# Patient Record
Sex: FEMALE | Race: BLACK OR AFRICAN AMERICAN | NOT HISPANIC OR LATINO | URBAN - METROPOLITAN AREA
[De-identification: names, ages, dates, MRNs, and addresses within clinical notes are randomized per-mention and may not be internally consistent; named-entity substitution may affect disease eponyms.]

---

## 2018-03-26 ENCOUNTER — INPATIENT (INPATIENT)
Facility: HOSPITAL | Age: 33
LOS: 2 days | Discharge: HOME | DRG: 440 | End: 2018-03-29
Attending: SURGERY | Admitting: SURGERY
Payer: COMMERCIAL

## 2018-03-26 VITALS
WEIGHT: 179.9 LBS | HEIGHT: 63 IN | OXYGEN SATURATION: 99 % | HEART RATE: 88 BPM | TEMPERATURE: 99 F | DIASTOLIC BLOOD PRESSURE: 76 MMHG | RESPIRATION RATE: 18 BRPM | SYSTOLIC BLOOD PRESSURE: 109 MMHG

## 2018-03-26 DIAGNOSIS — K85.10 BILIARY ACUTE PANCREATITIS WITHOUT NECROSIS OR INFECTION: ICD-10-CM

## 2018-03-26 DIAGNOSIS — Z98.891 HISTORY OF UTERINE SCAR FROM PREVIOUS SURGERY: Chronic | ICD-10-CM

## 2018-03-26 PROBLEM — Z00.00 ENCOUNTER FOR PREVENTIVE HEALTH EXAMINATION: Status: ACTIVE | Noted: 2018-03-26

## 2018-03-26 LAB
ALBUMIN SERPL ELPH-MCNC: 3.8 G/DL — SIGNIFICANT CHANGE UP (ref 3.5–5)
ALP SERPL-CCNC: 163 U/L — HIGH (ref 40–120)
ALT FLD-CCNC: 391 U/L DA — HIGH (ref 10–60)
ANION GAP SERPL CALC-SCNC: 7 MMOL/L — SIGNIFICANT CHANGE UP (ref 5–17)
APPEARANCE UR: CLEAR — SIGNIFICANT CHANGE UP
AST SERPL-CCNC: 233 U/L — HIGH (ref 10–40)
BILIRUB SERPL-MCNC: 0.4 MG/DL — SIGNIFICANT CHANGE UP (ref 0.2–1.2)
BILIRUB UR-MCNC: NEGATIVE — SIGNIFICANT CHANGE UP
BUN SERPL-MCNC: 11 MG/DL — SIGNIFICANT CHANGE UP (ref 7–18)
CALCIUM SERPL-MCNC: 9.2 MG/DL — SIGNIFICANT CHANGE UP (ref 8.4–10.5)
CHLORIDE SERPL-SCNC: 106 MMOL/L — SIGNIFICANT CHANGE UP (ref 96–108)
CO2 SERPL-SCNC: 24 MMOL/L — SIGNIFICANT CHANGE UP (ref 22–31)
COLOR SPEC: YELLOW — SIGNIFICANT CHANGE UP
CREAT SERPL-MCNC: 0.79 MG/DL — SIGNIFICANT CHANGE UP (ref 0.5–1.3)
DIFF PNL FLD: ABNORMAL
GLUCOSE SERPL-MCNC: 79 MG/DL — SIGNIFICANT CHANGE UP (ref 70–99)
GLUCOSE UR QL: NEGATIVE — SIGNIFICANT CHANGE UP
HCG UR QL: NEGATIVE — SIGNIFICANT CHANGE UP
HCT VFR BLD CALC: 43.3 % — SIGNIFICANT CHANGE UP (ref 34.5–45)
HGB BLD-MCNC: 14.2 G/DL — SIGNIFICANT CHANGE UP (ref 11.5–15.5)
KETONES UR-MCNC: NEGATIVE — SIGNIFICANT CHANGE UP
LEUKOCYTE ESTERASE UR-ACNC: NEGATIVE — SIGNIFICANT CHANGE UP
LIDOCAIN IGE QN: 3463 U/L — HIGH (ref 73–393)
MCHC RBC-ENTMCNC: 28.2 PG — SIGNIFICANT CHANGE UP (ref 27–34)
MCHC RBC-ENTMCNC: 32.8 GM/DL — SIGNIFICANT CHANGE UP (ref 32–36)
MCV RBC AUTO: 86 FL — SIGNIFICANT CHANGE UP (ref 80–100)
NITRITE UR-MCNC: NEGATIVE — SIGNIFICANT CHANGE UP
PH UR: 5 — SIGNIFICANT CHANGE UP (ref 5–8)
PLATELET # BLD AUTO: 463 K/UL — HIGH (ref 150–400)
POTASSIUM SERPL-MCNC: 4 MMOL/L — SIGNIFICANT CHANGE UP (ref 3.5–5.3)
POTASSIUM SERPL-SCNC: 4 MMOL/L — SIGNIFICANT CHANGE UP (ref 3.5–5.3)
PROT SERPL-MCNC: 7.9 G/DL — SIGNIFICANT CHANGE UP (ref 6–8.3)
PROT UR-MCNC: NEGATIVE — SIGNIFICANT CHANGE UP
RBC # BLD: 5.04 M/UL — SIGNIFICANT CHANGE UP (ref 3.8–5.2)
RBC # FLD: 13.1 % — SIGNIFICANT CHANGE UP (ref 10.3–14.5)
SODIUM SERPL-SCNC: 137 MMOL/L — SIGNIFICANT CHANGE UP (ref 135–145)
SP GR SPEC: 1.02 — SIGNIFICANT CHANGE UP (ref 1.01–1.02)
UROBILINOGEN FLD QL: NEGATIVE — SIGNIFICANT CHANGE UP
WBC # BLD: 5.3 K/UL — SIGNIFICANT CHANGE UP (ref 3.8–10.5)
WBC # FLD AUTO: 5.3 K/UL — SIGNIFICANT CHANGE UP (ref 3.8–10.5)

## 2018-03-26 PROCEDURE — 99285 EMERGENCY DEPT VISIT HI MDM: CPT

## 2018-03-26 PROCEDURE — 76705 ECHO EXAM OF ABDOMEN: CPT | Mod: 26

## 2018-03-26 PROCEDURE — 99222 1ST HOSP IP/OBS MODERATE 55: CPT

## 2018-03-26 RX ORDER — ONDANSETRON 8 MG/1
4 TABLET, FILM COATED ORAL EVERY 6 HOURS
Qty: 0 | Refills: 0 | Status: DISCONTINUED | OUTPATIENT
Start: 2018-03-26 | End: 2018-03-29

## 2018-03-26 RX ORDER — FAMOTIDINE 10 MG/ML
20 INJECTION INTRAVENOUS ONCE
Qty: 0 | Refills: 0 | Status: COMPLETED | OUTPATIENT
Start: 2018-03-26 | End: 2018-03-26

## 2018-03-26 RX ORDER — HEPARIN SODIUM 5000 [USP'U]/ML
5000 INJECTION INTRAVENOUS; SUBCUTANEOUS EVERY 8 HOURS
Qty: 0 | Refills: 0 | Status: DISCONTINUED | OUTPATIENT
Start: 2018-03-26 | End: 2018-03-29

## 2018-03-26 RX ORDER — SODIUM CHLORIDE 9 MG/ML
1000 INJECTION INTRAMUSCULAR; INTRAVENOUS; SUBCUTANEOUS
Qty: 0 | Refills: 0 | Status: DISCONTINUED | OUTPATIENT
Start: 2018-03-26 | End: 2018-03-27

## 2018-03-26 RX ADMIN — SODIUM CHLORIDE 120 MILLILITER(S): 9 INJECTION INTRAMUSCULAR; INTRAVENOUS; SUBCUTANEOUS at 23:45

## 2018-03-26 RX ADMIN — FAMOTIDINE 20 MILLIGRAM(S): 10 INJECTION INTRAVENOUS at 13:31

## 2018-03-26 RX ADMIN — Medication 30 MILLILITER(S): at 13:31

## 2018-03-26 NOTE — ED PROVIDER NOTE - PROGRESS NOTE DETAILS
choledocholithiasis / gallstone pancreatitis. d/w med - requesting surg consult. d/w surg pa - will see pt.

## 2018-03-26 NOTE — H&P ADULT - HISTORY OF PRESENT ILLNESS
33 year old female pmhx gastric reflux presents with epigastric pain worsening for 2 weeks. She denies nausea or vomiting and no fevers at home. She says pain is sometimes precipitated after she eats but not always. She denies any similar episodes in the past.

## 2018-03-26 NOTE — ED PROVIDER NOTE - MEDICAL DECISION MAKING DETAILS
34 y/o F pt presents with epigastric pain. Do not suspect cardiac cause given the positional nature of the pain as well as reproducibility. Likely gastritis. Will refer to GI, will do US of abdomen to r/o acute cholecystitis (however I believe this is unlikely), and will reassess.

## 2018-03-26 NOTE — H&P ADULT - PROBLEM SELECTOR PLAN 1
1) admit to surgery  2) npo  3) iv fluids  4) f.u CT scan to evaluate pancreas  5) trend lft's  6) plan for lap deonna  7) case and plan discussed with Dr. Danielle and agrees

## 2018-03-26 NOTE — H&P ADULT - NSHPLABSRESULTS_GEN_ALL_CORE
14.2   5.3   )-----------( 463      ( 26 Mar 2018 13:46 )             43.3   03-26    137  |  106  |  11  ----------------------------<  79  4.0   |  24  |  0.79    Ca    9.2      26 Mar 2018 13:46    TPro  7.9  /  Alb  3.8  /  TBili  0.4  /  DBili  x   /  AST  233<H>  /  ALT  391<H>  /  AlkPhos  163<H>  03-26  < from: US Hepatic & Pancreatic (03.26.18 @ 15:20) >    IMPRESSION:  1. Multiple gallstones identified within a contracted gallbladder.   Gallbladder wall thickness approximates 3-4 mm.  2. Extrahepatic CBD measures 7 mm, prominent in size. A 5-6 mm   hyperechoic focus within the proximal to mid CBD may represent a CBD   calculus.  3. 2.8 x 2.5 x 2.5 cm homogeneously hyperechoic lesion right hepatic   lobe, which may represent a hepatic parenchymal hemangioma.    Clinical correlation is suggested. Further evaluation with either   contrast-enhanced CT of the abdomen/pelvis or contrast enhanced MRI   abdomen with MRCP can be performed.    < end of copied text >

## 2018-03-26 NOTE — H&P ADULT - NSHPPHYSICALEXAM_GEN_ALL_CORE
General: A&Ox3, NAD  Skin: no jaundice, no jaundice  Abdomen: soft, epigastric tenderness to palpation, nondistended, no masses, well healed  scar  Extremities: no edema, no calf pain bilaterally

## 2018-03-26 NOTE — ED PROVIDER NOTE - OBJECTIVE STATEMENT
34 y/o F pt with PMHx of Acid Reflux and no significant PSHx presents to ED c/o upper abdominal pain since yesterday. Pt describes upper abdominal pain as worse when lying down and constant. Per pt, pt was seen in the ER x3 weeks ago for similar sx's; pt was prescribed Pepcid 20mg TID, and took Pepcid for several weeks with no relief of sx's. Pt reports switching medications to Zantac 150mg with mild relief of sx's. Pt states that no US of the abdomen was performed in the ED x3 weeks ago, however an EKG was performed at the time, which was found to be "abnormal". Pt denies nausea, vomiting, diarrhea, constipation, cough, sneezing, runny nose, or any other complaints. Pt also denies possibility of pregnancy, recent illness, Hx of gallbladder problems, or having visited a GI physician for current sx's. Pt notes having similar sx's in the past, but not as severe as current sx's. NKDA.

## 2018-03-27 LAB
ALBUMIN SERPL ELPH-MCNC: 3.5 G/DL — SIGNIFICANT CHANGE UP (ref 3.5–5)
ALP SERPL-CCNC: 137 U/L — HIGH (ref 40–120)
ALT FLD-CCNC: 289 U/L DA — HIGH (ref 10–60)
ANION GAP SERPL CALC-SCNC: 9 MMOL/L — SIGNIFICANT CHANGE UP (ref 5–17)
AST SERPL-CCNC: 105 U/L — HIGH (ref 10–40)
BILIRUB SERPL-MCNC: 0.7 MG/DL — SIGNIFICANT CHANGE UP (ref 0.2–1.2)
BUN SERPL-MCNC: 10 MG/DL — SIGNIFICANT CHANGE UP (ref 7–18)
CALCIUM SERPL-MCNC: 9.1 MG/DL — SIGNIFICANT CHANGE UP (ref 8.4–10.5)
CHLORIDE SERPL-SCNC: 107 MMOL/L — SIGNIFICANT CHANGE UP (ref 96–108)
CO2 SERPL-SCNC: 22 MMOL/L — SIGNIFICANT CHANGE UP (ref 22–31)
CREAT SERPL-MCNC: 0.79 MG/DL — SIGNIFICANT CHANGE UP (ref 0.5–1.3)
GLUCOSE SERPL-MCNC: 75 MG/DL — SIGNIFICANT CHANGE UP (ref 70–99)
HCT VFR BLD CALC: 43.4 % — SIGNIFICANT CHANGE UP (ref 34.5–45)
HGB BLD-MCNC: 13.9 G/DL — SIGNIFICANT CHANGE UP (ref 11.5–15.5)
LIDOCAIN IGE QN: 493 U/L — HIGH (ref 73–393)
MCHC RBC-ENTMCNC: 27.2 PG — SIGNIFICANT CHANGE UP (ref 27–34)
MCHC RBC-ENTMCNC: 32 GM/DL — SIGNIFICANT CHANGE UP (ref 32–36)
MCV RBC AUTO: 84.9 FL — SIGNIFICANT CHANGE UP (ref 80–100)
PLATELET # BLD AUTO: 432 K/UL — HIGH (ref 150–400)
POTASSIUM SERPL-MCNC: 4 MMOL/L — SIGNIFICANT CHANGE UP (ref 3.5–5.3)
POTASSIUM SERPL-SCNC: 4 MMOL/L — SIGNIFICANT CHANGE UP (ref 3.5–5.3)
PROT SERPL-MCNC: 7.6 G/DL — SIGNIFICANT CHANGE UP (ref 6–8.3)
RBC # BLD: 5.12 M/UL — SIGNIFICANT CHANGE UP (ref 3.8–5.2)
RBC # FLD: 12.9 % — SIGNIFICANT CHANGE UP (ref 10.3–14.5)
SODIUM SERPL-SCNC: 138 MMOL/L — SIGNIFICANT CHANGE UP (ref 135–145)
WBC # BLD: 5.6 K/UL — SIGNIFICANT CHANGE UP (ref 3.8–10.5)
WBC # FLD AUTO: 5.6 K/UL — SIGNIFICANT CHANGE UP (ref 3.8–10.5)

## 2018-03-27 PROCEDURE — 74177 CT ABD & PELVIS W/CONTRAST: CPT | Mod: 26

## 2018-03-27 PROCEDURE — 99231 SBSQ HOSP IP/OBS SF/LOW 25: CPT

## 2018-03-27 PROCEDURE — 99223 1ST HOSP IP/OBS HIGH 75: CPT

## 2018-03-27 RX ORDER — SODIUM CHLORIDE 9 MG/ML
1000 INJECTION, SOLUTION INTRAVENOUS
Qty: 0 | Refills: 0 | Status: DISCONTINUED | OUTPATIENT
Start: 2018-03-27 | End: 2018-03-29

## 2018-03-27 NOTE — PROGRESS NOTE ADULT - SUBJECTIVE AND OBJECTIVE BOX
SUBJECTIVE    Nausea [ ] YES [ X] NO  Vomiting [ ] YES [X ] NO  Voiding normally [X ] YES [ ] NO  Flatus [X ] YES [ ] NO  BM [ ] YES [X ] NO  Pain under control [X ] YES [ ] NO  NPO  Ambulated [X ] YES NO [ ]  Using Incentive Spirometer [ X] YES [ ] NO      VITALS    ICU Vital Signs Last 24 Hrs  T(C): 36.9 (27 Mar 2018 05:59), Max: 37 (26 Mar 2018 09:50)  T(F): 98.4 (27 Mar 2018 05:59), Max: 98.6 (26 Mar 2018 09:50)  HR: 65 (27 Mar 2018 05:59) (60 - 88)  BP: 105/65 (27 Mar 2018 05:59) (96/55 - 126/81)  BP(mean): --  ABP: --  ABP(mean): --  RR: 18 (27 Mar 2018 05:59) (18 - 18)  SpO2: 98% (27 Mar 2018 05:59) (98% - 100%)    I&O's Detail        PHYSICAL EXAMINATION    Abdomen: soft, ND, NT    I&O's Summary      LABS                        14.2   5.3   )-----------( 463      ( 26 Mar 2018 13:46 )             43.3             03-27    138  |  107  |  10  ----------------------------<  75  4.0   |  22  |  0.79    Ca    9.1      27 Mar 2018 07:21    TPro  7.6  /  Alb  3.5  /  TBili  0.7  /  DBili  x   /  AST  105<H>  /  ALT  289<H>  /  AlkPhos  137<H>  03-27    LIVER FUNCTIONS - ( 27 Mar 2018 07:21 )  Alb: 3.5 g/dL / Pro: 7.6 g/dL / ALK PHOS: 137 U/L / ALT: 289 U/L DA / AST: 105 U/L / GGT: x             MEDICATIONS:  MEDICATIONS  (STANDING):  heparin  Injectable 5000 Unit(s) SubCutaneous every 8 hours  sodium chloride 0.9%. 1000 milliLiter(s) (120 mL/Hr) IV Continuous <Continuous>    MEDICATIONS  (PRN):  ondansetron Injectable 4 milliGRAM(s) IV Push every 6 hours PRN Nausea

## 2018-03-27 NOTE — CONSULT NOTE ADULT - ASSESSMENT
33y Female  presents with a chief complaint of epigastric pain. US and CT showed pancreatitis, sludge/stone in the distal CBD.    Hemodynamically stable  no leukocytosis  Lipase, LFTs trending down  ERCP, with stent for Thursday  advance diet as tolerated, NPO at Wednesday midnight

## 2018-03-27 NOTE — CONSULT NOTE ADULT - SUBJECTIVE AND OBJECTIVE BOX
Patient is a 33y old  Female who presents with a chief complaint of epigastric abdominal pain (26 Mar 2018 16:50)    HPI: 33y Female  presents with a chief complaint of epigastric pain. US and CT showed pancreatitis, sludge/stone in the distal CBD. Lipase trending down. Bilirubin normal. The patient has a significant past medical history of GERD.    Currently, no complaints of abdominal pain. Patient expressed desire to eat. Willing to stay for ERCP if given diet.     REVIEW OF SYSTEMS  Constitutional:   No fever, no fatigue, no pallor, no night sweats, no weight loss.  HEENT:   No eye pain, no vision changes, no icterus, no mouth ulcers.  Respiratory:   No shortness of breath, no cough, no respiratory distress.   Cardiovascular:   No chest pain, no palpitations.   Gastrointestinal: No abdominal pain, no nausea, no vomiting , no diahrrea, no constipation, no hematochezia, no melena.  Skin:   No rashes, no jaundice, no eczema.   Musculoskeletal:   No joint pain, no swelling, no myalgia.   Neurologic:   No headache, no seizure, no weakness.   Genitourinary:   No dysuria, no decreased urine output.  Psychiatric:  No depression, no anxiety,   Endocrine:   No thyroid disease, no diabetes.  Heme/Lymphatic:   No anemia, no blood transfusions, no lymph node enlargement, no bleeding, no bruising.  ___________________________________________________________________________________________  Allergies    No Known Drug Allergies  shrimp (Hives; Swelling)    Intolerances      MEDICATIONS  (STANDING):  dextrose 5% + sodium chloride 0.45% 1000 milliLiter(s) (125 mL/Hr) IV Continuous <Continuous>  heparin  Injectable 5000 Unit(s) SubCutaneous every 8 hours    MEDICATIONS  (PRN):  ondansetron Injectable 4 milliGRAM(s) IV Push every 6 hours PRN Nausea      PAST MEDICAL & SURGICAL HISTORY:  Acid reflux  H/O:     FAMILY HISTORY:  No pertinent family history in first degree relatives    Social History: No hsitory of : Tobacco use, IVDA, EToH  ______________________________________________________________________________________    PHYSICAL EXAM    Daily Height in cm: 160.02 (27 Mar 2018 02:32)    Daily   BMI: 31.9 ( @ 02:32)  Change in Weight:  Vital Signs Last 24 Hrs  T(C): 37 (27 Mar 2018 13:39), Max: 37 (27 Mar 2018 13:39)  T(F): 98.6 (27 Mar 2018 13:39), Max: 98.6 (27 Mar 2018 13:39)  HR: 86 (27 Mar 2018 13:39) (60 - 86)  BP: 117/72 (27 Mar 2018 13:39) (96/55 - 126/81)  BP(mean): --  RR: 17 (27 Mar 2018 13:39) (17 - 18)  SpO2: 100% (27 Mar 2018 13:39) (98% - 100%)    General:  Well developed, well nourished, alert and active, no pallor, NAD.  HEENT:    Normal appearance of conjunctiva, ears, nose, lips, oropharynx, and oral mucosa, anicteric.  Neck:  No masses, no asymmetry.  Lymph Nodes:  No lymphadenopathy.   Cardiovascular:  RRR normal S1/S2, no murmur.  Respiratory:  CTA B/L, normal respiratory effort.   Abdominal:   soft, no masses or tenderness, normoactive BS, NT/ND, no HSM.  Extremities:   No clubbing or cyanosis, normal capillary refill, no edema.   Skin:   No rash, jaundice, lesions, eczema.   Musculoskeletal:  No joint swelling, erythema or tenderness.   Neuro: No focal deficits.   Other:   _______________________________________________________________________________________________  Lab Results:                          13.9   5.6   )-----------( 432      ( 27 Mar 2018 07:21 )             43.4         138  |  107  |  10  ----------------------------<  75  4.0   |  22  |  0.79    Ca    9.1      27 Mar 2018 07:21    TPro  7.6  /  Alb  3.5  /  TBili  0.7  /  DBili  x   /  AST  105<H>  /  ALT  289<H>  /  AlkPhos  137<H>      LIVER FUNCTIONS - ( 27 Mar 2018 07:21 )  Alb: 3.5 g/dL / Pro: 7.6 g/dL / ALK PHOS: 137 U/L / ALT: 289 U/L DA / AST: 105 U/L / GGT: x                   Stool Results:          RADIOLOGY RESULTS:    SURGICAL PATHOLOGY:

## 2018-03-27 NOTE — PROGRESS NOTE ADULT - ASSESSMENT
33F with gallstone pancreatitis. Pain resolved.     1) f/u labs  2) if necessary, MRCP and ERCP if indicated  3) patient adamant to schedule surgery after this admission. I explained risk of recurrence and complications thereof.

## 2018-03-28 PROCEDURE — 99231 SBSQ HOSP IP/OBS SF/LOW 25: CPT

## 2018-03-28 NOTE — PROGRESS NOTE ADULT - ATTENDING COMMENTS
as above, I agree with the stated assessment and plan. ERCP tomorrow given findings on imaging at admission.

## 2018-03-28 NOTE — PROGRESS NOTE ADULT - ASSESSMENT
32 y/o Female w/ gallstone pancreatitis     -f/u AM labs   -Trend LFTs   -Low fat diet   -ERCP 3/29 w/ GI   -DVT ppx

## 2018-03-28 NOTE — PROGRESS NOTE ADULT - SUBJECTIVE AND OBJECTIVE BOX
INTERVAL HPI/OVERNIGHT EVENTS:    Pt seen and examined at bedside. No acute complaints at this time. Abd pain improved, no nausea, vomiting. Denies fever, chills, SOB or CP. On low fat diet, tolerating well.      Vital Signs Last 24 Hrs  T(C): 36.9 (28 Mar 2018 06:23), Max: 37.2 (27 Mar 2018 22:20)  T(F): 98.5 (28 Mar 2018 06:23), Max: 98.9 (27 Mar 2018 22:20)  HR: 67 (28 Mar 2018 06:23) (67 - 86)  BP: 104/59 (28 Mar 2018 06:23) (104/59 - 117/72)  BP(mean): --  RR: 18 (28 Mar 2018 06:23) (17 - 18)  SpO2: 97% (28 Mar 2018 06:23) (97% - 100%)  I&O's Detail        Physical Exam  General: AAOx3, No acute distress  Skin: No jaundice, no icterus  Abdomen: soft, nondistended, nontender, no rebound tenderness, no guarding, no palpable masses  : Normal external genitalia  Extremities: non edematous, no calf pain bilaterally

## 2018-03-28 NOTE — PROGRESS NOTE ADULT - NSHPATTENDINGPLANDISCUSS_GEN_ALL_CORE
surgical team and patient; patient agrees with plan of care
surgical team and patient; patient agrees with plan

## 2018-03-29 ENCOUNTER — TRANSCRIPTION ENCOUNTER (OUTPATIENT)
Age: 33
End: 2018-03-29

## 2018-03-29 VITALS
DIASTOLIC BLOOD PRESSURE: 61 MMHG | SYSTOLIC BLOOD PRESSURE: 114 MMHG | HEART RATE: 73 BPM | RESPIRATION RATE: 16 BRPM | TEMPERATURE: 99 F | OXYGEN SATURATION: 98 %

## 2018-03-29 PROBLEM — K21.9 GASTRO-ESOPHAGEAL REFLUX DISEASE WITHOUT ESOPHAGITIS: Chronic | Status: ACTIVE | Noted: 2018-03-26

## 2018-03-29 LAB
ALBUMIN SERPL ELPH-MCNC: 3.7 G/DL — SIGNIFICANT CHANGE UP (ref 3.5–5)
ALP SERPL-CCNC: 122 U/L — HIGH (ref 40–120)
ALT FLD-CCNC: 154 U/L DA — HIGH (ref 10–60)
ANION GAP SERPL CALC-SCNC: 7 MMOL/L — SIGNIFICANT CHANGE UP (ref 5–17)
AST SERPL-CCNC: 24 U/L — SIGNIFICANT CHANGE UP (ref 10–40)
BILIRUB SERPL-MCNC: 0.6 MG/DL — SIGNIFICANT CHANGE UP (ref 0.2–1.2)
BUN SERPL-MCNC: 11 MG/DL — SIGNIFICANT CHANGE UP (ref 7–18)
CALCIUM SERPL-MCNC: 9.3 MG/DL — SIGNIFICANT CHANGE UP (ref 8.4–10.5)
CHLORIDE SERPL-SCNC: 107 MMOL/L — SIGNIFICANT CHANGE UP (ref 96–108)
CO2 SERPL-SCNC: 25 MMOL/L — SIGNIFICANT CHANGE UP (ref 22–31)
CREAT SERPL-MCNC: 0.81 MG/DL — SIGNIFICANT CHANGE UP (ref 0.5–1.3)
GLUCOSE SERPL-MCNC: 80 MG/DL — SIGNIFICANT CHANGE UP (ref 70–99)
HCT VFR BLD CALC: 44 % — SIGNIFICANT CHANGE UP (ref 34.5–45)
HGB BLD-MCNC: 14.1 G/DL — SIGNIFICANT CHANGE UP (ref 11.5–15.5)
MCHC RBC-ENTMCNC: 27.2 PG — SIGNIFICANT CHANGE UP (ref 27–34)
MCHC RBC-ENTMCNC: 32 GM/DL — SIGNIFICANT CHANGE UP (ref 32–36)
MCV RBC AUTO: 85.1 FL — SIGNIFICANT CHANGE UP (ref 80–100)
PLATELET # BLD AUTO: 431 K/UL — HIGH (ref 150–400)
POTASSIUM SERPL-MCNC: 4.1 MMOL/L — SIGNIFICANT CHANGE UP (ref 3.5–5.3)
POTASSIUM SERPL-SCNC: 4.1 MMOL/L — SIGNIFICANT CHANGE UP (ref 3.5–5.3)
PROT SERPL-MCNC: 8.3 G/DL — SIGNIFICANT CHANGE UP (ref 6–8.3)
RBC # BLD: 5.18 M/UL — SIGNIFICANT CHANGE UP (ref 3.8–5.2)
RBC # FLD: 12.8 % — SIGNIFICANT CHANGE UP (ref 10.3–14.5)
SODIUM SERPL-SCNC: 139 MMOL/L — SIGNIFICANT CHANGE UP (ref 135–145)
WBC # BLD: 4.5 K/UL — SIGNIFICANT CHANGE UP (ref 3.8–10.5)
WBC # FLD AUTO: 4.5 K/UL — SIGNIFICANT CHANGE UP (ref 3.8–10.5)

## 2018-03-29 PROCEDURE — 74177 CT ABD & PELVIS W/CONTRAST: CPT

## 2018-03-29 PROCEDURE — 80053 COMPREHEN METABOLIC PANEL: CPT

## 2018-03-29 PROCEDURE — 99231 SBSQ HOSP IP/OBS SF/LOW 25: CPT

## 2018-03-29 PROCEDURE — 83690 ASSAY OF LIPASE: CPT

## 2018-03-29 PROCEDURE — 85027 COMPLETE CBC AUTOMATED: CPT

## 2018-03-29 PROCEDURE — 93005 ELECTROCARDIOGRAM TRACING: CPT

## 2018-03-29 PROCEDURE — 81025 URINE PREGNANCY TEST: CPT

## 2018-03-29 PROCEDURE — 81001 URINALYSIS AUTO W/SCOPE: CPT

## 2018-03-29 PROCEDURE — 76705 ECHO EXAM OF ABDOMEN: CPT

## 2018-03-29 PROCEDURE — 96374 THER/PROPH/DIAG INJ IV PUSH: CPT

## 2018-03-29 PROCEDURE — 99285 EMERGENCY DEPT VISIT HI MDM: CPT | Mod: 25

## 2018-03-29 RX ORDER — INDOMETHACIN 50 MG
100 CAPSULE ORAL ONCE
Qty: 0 | Refills: 0 | Status: DISCONTINUED | OUTPATIENT
Start: 2018-03-29 | End: 2018-03-29

## 2018-03-29 NOTE — DISCHARGE NOTE ADULT - CARE PLAN
Principal Discharge DX:	Gallstone pancreatitis  Goal:	Tolerate diet, follow up with GI  Assessment and plan of treatment:	Please follow up with GI- Dr. Cleary within 2 weeks for elective ERCP  Maintain Low fat diet   Follow up with Dr. Danielle if you want to proceed with surgery

## 2018-03-29 NOTE — DISCHARGE NOTE ADULT - PATIENT PORTAL LINK FT
You can access the [facility] Patient Portal, offered by [facility], by registering with the following website: [email or web address]

## 2018-03-29 NOTE — PROGRESS NOTE ADULT - SUBJECTIVE AND OBJECTIVE BOX
Surgery    Subjective:  Pt resting comfortably. Extremely anxious about upcoming procedures.  Has many questions.  Tolerating diet  Denies N/V    T(C): 37 (03-29-18 @ 06:04), Max: 37 (03-28-18 @ 14:39)  HR: 73 (03-29-18 @ 06:04) (59 - 82)  BP: 114/61 (03-29-18 @ 06:04) (113/84 - 120/75)  RR: 16 (03-29-18 @ 06:04) (16 - 18)  SpO2: 98% (03-29-18 @ 06:04) (96% - 98%)    Physical:  Gen: A&O x3  Abd: Soft ND, NT

## 2018-03-29 NOTE — DISCHARGE NOTE ADULT - PLAN OF CARE
Tolerate diet, follow up with GI Please follow up with GI- Dr. Cleary within 2 weeks for elective ERCP  Maintain Low fat diet   Follow up with Dr. Danielle if you want to proceed with surgery

## 2018-03-29 NOTE — DISCHARGE NOTE ADULT - HOSPITAL COURSE
33 year old female pmhx gastric reflux presents with epigastric pain worsening for 2 weeks. She denies nausea or vomiting and no fevers at home. She says pain is sometimes precipitated after she eats but not always. She denies any similar episodes in the past.     Patient was admitted with gallstone pancreatitis. Patient was kept NPO and started in IV fluids and antibiotics. Patient was seen by GI- offered to have ERCP. Patient does not was to proceed with ercp or surgery at this time. Patient has no signs of CBD obstruction and pancreatitis resolved. DIet was advanced and tolerated. Patient for d/c home with outpatient follow up

## 2018-03-29 NOTE — CHART NOTE - NSCHARTNOTEFT_GEN_A_CORE
When explaining the risks of the procedure ERCP the patient became concerned and hesitant to sign consent.     She will follow up outpatient ofr elective ERCP and cholecystectomy.  I explained the risks of not having the ERCP today and she understands.  I explained that if she experiences fever , chills, nausea, vomiting to return to the hospital.

## 2018-03-29 NOTE — DISCHARGE NOTE ADULT - CARE PROVIDERS DIRECT ADDRESSES
[email or web address]

## 2018-03-29 NOTE — DISCHARGE NOTE ADULT - CARE PROVIDER_API CALL
Louis Palomino), Gastroenterology; Internal Medicine  [address] [place] [address]  Phone: [phone]  Fax: [phone]    Cristian Danielle), Surgery  [address] [place] [address]  Phone: [phone]  Fax: [phone]

## 2018-03-29 NOTE — DISCHARGE NOTE ADULT - CONDITIONS AT DISCHARGE
Pt AOx3 breathing unlabored no c/o resp. distress chest pain or SOB. Pt with Dx. of Acute Billary pancreatitis denies any pain at current time for possible ERCP today. Abdomen soft non tender non distended BS present in all 4 quadrants Pt remains NPO for procedure today. Cap refill less than 3 seconds pulses present in all extremities Pt OOB ambulating voiding without any difficulties. Vital signs stable no distress noted. Pt refusing ERCP for today advised to follow up with Dr. Cleary as outpatient procedure, pt verbalizes understanding and agreement with instructions Pt for DC home verbalizes understanding and agreement with DC. All needs of pt met thus far .

## 2018-03-31 ENCOUNTER — INPATIENT (INPATIENT)
Facility: HOSPITAL | Age: 33
LOS: 6 days | Discharge: HOME | End: 2018-04-07
Attending: SPECIALIST | Admitting: SPECIALIST
Payer: COMMERCIAL

## 2018-03-31 VITALS
RESPIRATION RATE: 18 BRPM | OXYGEN SATURATION: 100 % | DIASTOLIC BLOOD PRESSURE: 76 MMHG | SYSTOLIC BLOOD PRESSURE: 127 MMHG | HEART RATE: 76 BPM | TEMPERATURE: 99 F

## 2018-03-31 DIAGNOSIS — K85.10 BILIARY ACUTE PANCREATITIS WITHOUT NECROSIS OR INFECTION: ICD-10-CM

## 2018-03-31 DIAGNOSIS — Z98.891 HISTORY OF UTERINE SCAR FROM PREVIOUS SURGERY: Chronic | ICD-10-CM

## 2018-03-31 LAB
ALBUMIN SERPL ELPH-MCNC: 4.3 G/DL — SIGNIFICANT CHANGE UP (ref 3.3–5)
ALP SERPL-CCNC: 123 U/L — HIGH (ref 40–120)
ALT FLD-CCNC: 214 U/L — HIGH (ref 4–33)
AST SERPL-CCNC: 228 U/L — HIGH (ref 4–32)
BASOPHILS # BLD AUTO: 0.02 K/UL — SIGNIFICANT CHANGE UP (ref 0–0.2)
BASOPHILS NFR BLD AUTO: 0.2 % — SIGNIFICANT CHANGE UP (ref 0–2)
BILIRUB SERPL-MCNC: 1.2 MG/DL — SIGNIFICANT CHANGE UP (ref 0.2–1.2)
BUN SERPL-MCNC: 10 MG/DL — SIGNIFICANT CHANGE UP (ref 7–23)
BUN SERPL-MCNC: 9 MG/DL — SIGNIFICANT CHANGE UP (ref 7–23)
CALCIUM SERPL-MCNC: 8 MG/DL — LOW (ref 8.4–10.5)
CALCIUM SERPL-MCNC: 9 MG/DL — SIGNIFICANT CHANGE UP (ref 8.4–10.5)
CHLORIDE SERPL-SCNC: 100 MMOL/L — SIGNIFICANT CHANGE UP (ref 98–107)
CHLORIDE SERPL-SCNC: 103 MMOL/L — SIGNIFICANT CHANGE UP (ref 98–107)
CO2 SERPL-SCNC: 19 MMOL/L — LOW (ref 22–31)
CO2 SERPL-SCNC: 21 MMOL/L — LOW (ref 22–31)
CREAT SERPL-MCNC: 0.7 MG/DL — SIGNIFICANT CHANGE UP (ref 0.5–1.3)
CREAT SERPL-MCNC: 0.76 MG/DL — SIGNIFICANT CHANGE UP (ref 0.5–1.3)
EOSINOPHIL # BLD AUTO: 0.01 K/UL — SIGNIFICANT CHANGE UP (ref 0–0.5)
EOSINOPHIL NFR BLD AUTO: 0.1 % — SIGNIFICANT CHANGE UP (ref 0–6)
GLUCOSE SERPL-MCNC: 101 MG/DL — HIGH (ref 70–99)
GLUCOSE SERPL-MCNC: 109 MG/DL — HIGH (ref 70–99)
HCT VFR BLD CALC: 40.8 % — SIGNIFICANT CHANGE UP (ref 34.5–45)
HGB BLD-MCNC: 13.9 G/DL — SIGNIFICANT CHANGE UP (ref 11.5–15.5)
IMM GRANULOCYTES # BLD AUTO: 0.04 # — SIGNIFICANT CHANGE UP
IMM GRANULOCYTES NFR BLD AUTO: 0.4 % — SIGNIFICANT CHANGE UP (ref 0–1.5)
LIDOCAIN IGE QN: > 600 U/L — HIGH (ref 7–60)
LYMPHOCYTES # BLD AUTO: 0.49 K/UL — LOW (ref 1–3.3)
LYMPHOCYTES # BLD AUTO: 4.4 % — LOW (ref 13–44)
MCHC RBC-ENTMCNC: 27.4 PG — SIGNIFICANT CHANGE UP (ref 27–34)
MCHC RBC-ENTMCNC: 34.1 % — SIGNIFICANT CHANGE UP (ref 32–36)
MCV RBC AUTO: 80.5 FL — SIGNIFICANT CHANGE UP (ref 80–100)
MONOCYTES # BLD AUTO: 0.46 K/UL — SIGNIFICANT CHANGE UP (ref 0–0.9)
MONOCYTES NFR BLD AUTO: 4.2 % — SIGNIFICANT CHANGE UP (ref 2–14)
NEUTROPHILS # BLD AUTO: 10 K/UL — HIGH (ref 1.8–7.4)
NEUTROPHILS NFR BLD AUTO: 90.7 % — HIGH (ref 43–77)
NRBC # FLD: 0 — SIGNIFICANT CHANGE UP
PLATELET # BLD AUTO: 427 K/UL — HIGH (ref 150–400)
PMV BLD: 8.7 FL — SIGNIFICANT CHANGE UP (ref 7–13)
POTASSIUM SERPL-MCNC: 4.3 MMOL/L — SIGNIFICANT CHANGE UP (ref 3.5–5.3)
POTASSIUM SERPL-MCNC: 6.3 MMOL/L — CRITICAL HIGH (ref 3.5–5.3)
POTASSIUM SERPL-SCNC: 4.3 MMOL/L — SIGNIFICANT CHANGE UP (ref 3.5–5.3)
POTASSIUM SERPL-SCNC: 6.3 MMOL/L — CRITICAL HIGH (ref 3.5–5.3)
PROT SERPL-MCNC: 8.5 G/DL — HIGH (ref 6–8.3)
RBC # BLD: 5.07 M/UL — SIGNIFICANT CHANGE UP (ref 3.8–5.2)
RBC # FLD: 13.3 % — SIGNIFICANT CHANGE UP (ref 10.3–14.5)
SODIUM SERPL-SCNC: 135 MMOL/L — SIGNIFICANT CHANGE UP (ref 135–145)
SODIUM SERPL-SCNC: 136 MMOL/L — SIGNIFICANT CHANGE UP (ref 135–145)
WBC # BLD: 11.02 K/UL — HIGH (ref 3.8–10.5)
WBC # FLD AUTO: 11.02 K/UL — HIGH (ref 3.8–10.5)

## 2018-03-31 PROCEDURE — 76705 ECHO EXAM OF ABDOMEN: CPT | Mod: 26

## 2018-03-31 PROCEDURE — 99222 1ST HOSP IP/OBS MODERATE 55: CPT

## 2018-03-31 RX ORDER — MORPHINE SULFATE 50 MG/1
2 CAPSULE, EXTENDED RELEASE ORAL EVERY 4 HOURS
Qty: 0 | Refills: 0 | Status: DISCONTINUED | OUTPATIENT
Start: 2018-03-31 | End: 2018-04-03

## 2018-03-31 RX ORDER — SODIUM CHLORIDE 9 MG/ML
1000 INJECTION, SOLUTION INTRAVENOUS
Qty: 0 | Refills: 0 | Status: DISCONTINUED | OUTPATIENT
Start: 2018-03-31 | End: 2018-04-06

## 2018-03-31 RX ORDER — SODIUM CHLORIDE 9 MG/ML
1000 INJECTION INTRAMUSCULAR; INTRAVENOUS; SUBCUTANEOUS ONCE
Qty: 0 | Refills: 0 | Status: COMPLETED | OUTPATIENT
Start: 2018-03-31 | End: 2018-03-31

## 2018-03-31 RX ORDER — DOCUSATE SODIUM 100 MG
100 CAPSULE ORAL THREE TIMES A DAY
Qty: 0 | Refills: 0 | Status: DISCONTINUED | OUTPATIENT
Start: 2018-03-31 | End: 2018-04-03

## 2018-03-31 RX ORDER — ACETAMINOPHEN 500 MG
1000 TABLET ORAL ONCE
Qty: 0 | Refills: 0 | Status: COMPLETED | OUTPATIENT
Start: 2018-03-31 | End: 2018-03-31

## 2018-03-31 RX ORDER — ONDANSETRON 8 MG/1
4 TABLET, FILM COATED ORAL EVERY 6 HOURS
Qty: 0 | Refills: 0 | Status: DISCONTINUED | OUTPATIENT
Start: 2018-03-31 | End: 2018-04-03

## 2018-03-31 RX ORDER — MORPHINE SULFATE 50 MG/1
4 CAPSULE, EXTENDED RELEASE ORAL EVERY 4 HOURS
Qty: 0 | Refills: 0 | Status: DISCONTINUED | OUTPATIENT
Start: 2018-03-31 | End: 2018-04-03

## 2018-03-31 RX ORDER — MORPHINE SULFATE 50 MG/1
4 CAPSULE, EXTENDED RELEASE ORAL ONCE
Qty: 0 | Refills: 0 | Status: DISCONTINUED | OUTPATIENT
Start: 2018-03-31 | End: 2018-03-31

## 2018-03-31 RX ORDER — ONDANSETRON 8 MG/1
4 TABLET, FILM COATED ORAL ONCE
Qty: 0 | Refills: 0 | Status: COMPLETED | OUTPATIENT
Start: 2018-03-31 | End: 2018-03-31

## 2018-03-31 RX ADMIN — SODIUM CHLORIDE 1000 MILLILITER(S): 9 INJECTION INTRAMUSCULAR; INTRAVENOUS; SUBCUTANEOUS at 16:52

## 2018-03-31 RX ADMIN — MORPHINE SULFATE 4 MILLIGRAM(S): 50 CAPSULE, EXTENDED RELEASE ORAL at 16:52

## 2018-03-31 RX ADMIN — Medication 1000 MILLIGRAM(S): at 18:52

## 2018-03-31 RX ADMIN — MORPHINE SULFATE 4 MILLIGRAM(S): 50 CAPSULE, EXTENDED RELEASE ORAL at 17:45

## 2018-03-31 RX ADMIN — SODIUM CHLORIDE 100 MILLILITER(S): 9 INJECTION, SOLUTION INTRAVENOUS at 23:58

## 2018-03-31 RX ADMIN — SODIUM CHLORIDE 3000 MILLILITER(S): 9 INJECTION INTRAMUSCULAR; INTRAVENOUS; SUBCUTANEOUS at 18:52

## 2018-03-31 RX ADMIN — Medication 400 MILLIGRAM(S): at 18:16

## 2018-03-31 RX ADMIN — ONDANSETRON 4 MILLIGRAM(S): 8 TABLET, FILM COATED ORAL at 16:54

## 2018-03-31 NOTE — ED ADULT TRIAGE NOTE - CHIEF COMPLAINT QUOTE
Pt. BIBEMS c/o LUQ pain with 1 episode of vomiting today. Released from [facility] 2 days ago. Gallstones found and refused to get ERCP. Denies fevers.

## 2018-03-31 NOTE — ED PROVIDER NOTE - NEUROLOGICAL, MLM
Pt states she will call back, and schedule her apt. Alert and oriented, no focal deficits, no motor or sensory deficits.

## 2018-03-31 NOTE — ED PROVIDER NOTE - OBJECTIVE STATEMENT
33 year old female pmhx gastric reflux and recent admission to [place] for gallstone pancreatitis dced w instrx for GI f/u after refusing ERCP in hospital, p/w severe abdominal pain and nausea since earlier this morning. Denies CP, SOB, fevers, or chills. Her abdominal pain is very severe and worse in the LUQ.

## 2018-03-31 NOTE — ED ADULT NURSE REASSESSMENT NOTE - NS ED NURSE REASSESS COMMENT FT1
pt transfers to [facility] in stable condition accompanied by ALEXANDRE Moulton. Report given LYNETTE Nunes.

## 2018-03-31 NOTE — ED PROVIDER NOTE - MEDICAL DECISION MAKING DETAILS
33 year old female pmhx gastric reflux and recent admission to [place] for gallstone pancreatitis dced w instrx for GI f/u after refusing ERCP in hospital, p/w severe abdominal pain and nausea since earlier this morning. Denies CP, SOB, fevers, or chills. Her abdominal pain is very severe and worse in the LUQ. Will admin fluids, pain control, obtain labs, RUQ ultrasound , reassess -Nette 33 year old female pmhx gastric reflux and recent admission to [place] for gallstone pancreatitis dced w instrx for GI f/u after refusing ERCP in hospital, p/w severe abdominal pain and nausea since earlier this morning. Denies CP, SOB, fevers, or chills. Her abdominal pain is very severe and worse in the LUQ. Will admin fluids, pain control, obtain labs, RUQ ultrasound , reassess   Will admit for reoccurence of gallstone pancreatitis, pt will receive ERCP in hospital this time-Nette

## 2018-03-31 NOTE — H&P ADULT - NSHPLABSRESULTS_GEN_ALL_CORE
12.1   7.56  )-----------( 371             35.5                     04-01    135  |  101  |  6<L>  ----------------------------<  83  3.9   |  23  |  0.75    Ca    8.2<L>        Phos  3.2       Mg     2.2       TPro  6.6  /  Alb  3.4  /  TBili  0.6  /  DBili  0.2  /  AST  71<H>  /  ALT  136<H>  /  AlkPhos  101      Lipase: > 600      LIVER FUNCTIONS -   Alb: 3.4 g/dL / Pro: 6.6 g/dL / ALK PHOS: 101 u/L / ALT: 136 u/L / AST: 71 u/L / GGT: x                 IMAGING  < from: US Abdomen Limited (03.31.18 @ 17:43) >    IMPRESSION:     Cholelithiasis with positive sonographic Holguin sign, without other  imaging evidence of acute cholecystitis. If the diagnosis remains in   question, HIDA scan may be considered.    < end of copied text >

## 2018-03-31 NOTE — H&P ADULT - NSHPPHYSICALEXAM_GEN_ALL_CORE
Vital Signs Last 24 Hrs  T(C): 36.9  T(F): 98.5  HR: 51  BP: 122/80  RR: 18  SpO2: 100%    GEN: NAD, alert and oriented x 3  HEENT: WNL  CHEST: Symmetrical chest rise, breath sounds CTAB  HEART: RRR, non-muffled heart sounds  ABD: Soft, non-tender, non-distended. Well-healed low transverse supra-pubic scar.  EXT: No erythema/edema. Warm, sensate, motor function intact

## 2018-03-31 NOTE — ED PROVIDER NOTE - SHIFT CHANGE DETAILS
I have signed over this patient to the above attending physician. Pertinent history, physical exam findings and workup thus far in the ED have been discussed. The pending tests and plan, including US, labs, reassessment were signed over.  All questions from the above attending physician have been answered.

## 2018-03-31 NOTE — ED ADULT NURSE NOTE - CHIEF COMPLAINT QUOTE
Pt. BIBEMS c/o LUQ pain with 1 episode of vomiting today. Released from [facility] 2 days ago. Gallstones found and refused to get ERCP. Denies fevers.

## 2018-03-31 NOTE — ED PROVIDER NOTE - PROGRESS NOTE DETAILS
Labs show elevated lipase/LFTs, US concerning for cholelithiasis with + Holguin's sign, seen by surgery, they will admit for further treatment

## 2018-03-31 NOTE — ED ADULT NURSE NOTE - OBJECTIVE STATEMENT
Pt is AOX3, skin warm, dry and intact.  c/o abdominal pain.  IV access placed, labs drawn and sent.  Pt medicated. Continue to monitor.

## 2018-03-31 NOTE — ED PROVIDER NOTE - ATTENDING CONTRIBUTION TO CARE
Akhil: 34 yo female with a recent diagnosis of gallstone pancreatitis s/p d/c form [facility] 2 days ago after refusing ERCP. Pt presents today with return of pain, nausea and vomiting. NO fevers or chills. No dysuria. Exam: well appearing, visibly uncomfortable, dry mucus membranes, no scleral icterus, +S1/S2, no murmurs. lungs CTA b/l. abdomen is soft, + epigastric and LUQ TTP. NO CVA TTP. No LE edema. A/P- 34 yo female with likely gallstone pancreatitis will obtain RUQ US, labs, pain control, antiemetics, IVF and reassess.

## 2018-03-31 NOTE — H&P ADULT - HISTORY OF PRESENT ILLNESS
33y female - h/o GERD and  section 7 years prior - presents complaining of LUQ crampy abdominal pain x approximately 12 hours 33y female - h/o GERD and  section 7 years prior - presents complaining of LUQ crampy abdominal pain x approximately 12 hours. She was recently admitted to [facility] (on Monday, 3/26/18) with diffuse abdominal pain. The workup performed there revealed gallstone pancreatitis (lipase max of ~3500, t. bili max of 2.5). She was managed conservatively, and then was due for an ERCP on Thursday, 3/29, but refused the procedure when she felt that she didn't understand the purpose of it. She was discharged home in no pain, and tolerating a regular diet. Early this AM, she began to feel intermittent, 10/10, crampy LUQ abdominal pain, which she initially thought was gas. After beginning to feel nauseated, she induced vomiting once, with no relief. She presented to the [facility] ED, where she had one further episode of emesis. She states that her pain has been controlled and she is no longer nauseous. Denies any fever/chills, no darkening urine, no chest pain/SOB, no constipation/obstipation/diarrhea.

## 2018-04-01 DIAGNOSIS — K85.10 BILIARY ACUTE PANCREATITIS WITHOUT NECROSIS OR INFECTION: ICD-10-CM

## 2018-04-01 LAB
ALBUMIN SERPL ELPH-MCNC: 3.4 G/DL — SIGNIFICANT CHANGE UP (ref 3.3–5)
ALP SERPL-CCNC: 101 U/L — SIGNIFICANT CHANGE UP (ref 40–120)
ALT FLD-CCNC: 136 U/L — HIGH (ref 4–33)
AST SERPL-CCNC: 71 U/L — HIGH (ref 4–32)
BILIRUB DIRECT SERPL-MCNC: 0.2 MG/DL — SIGNIFICANT CHANGE UP (ref 0.1–0.2)
BILIRUB SERPL-MCNC: 0.6 MG/DL — SIGNIFICANT CHANGE UP (ref 0.2–1.2)
BUN SERPL-MCNC: 6 MG/DL — LOW (ref 7–23)
CA-I BLD-SCNC: 1.1 MMOL/L — SIGNIFICANT CHANGE UP (ref 1.03–1.23)
CALCIUM SERPL-MCNC: 8.2 MG/DL — LOW (ref 8.4–10.5)
CHLORIDE SERPL-SCNC: 101 MMOL/L — SIGNIFICANT CHANGE UP (ref 98–107)
CO2 SERPL-SCNC: 23 MMOL/L — SIGNIFICANT CHANGE UP (ref 22–31)
CREAT SERPL-MCNC: 0.75 MG/DL — SIGNIFICANT CHANGE UP (ref 0.5–1.3)
GLUCOSE SERPL-MCNC: 83 MG/DL — SIGNIFICANT CHANGE UP (ref 70–99)
HCT VFR BLD CALC: 35.5 % — SIGNIFICANT CHANGE UP (ref 34.5–45)
HGB BLD-MCNC: 12.1 G/DL — SIGNIFICANT CHANGE UP (ref 11.5–15.5)
MAGNESIUM SERPL-MCNC: 2.2 MG/DL — SIGNIFICANT CHANGE UP (ref 1.6–2.6)
MCHC RBC-ENTMCNC: 27.4 PG — SIGNIFICANT CHANGE UP (ref 27–34)
MCHC RBC-ENTMCNC: 34.1 % — SIGNIFICANT CHANGE UP (ref 32–36)
MCV RBC AUTO: 80.5 FL — SIGNIFICANT CHANGE UP (ref 80–100)
NRBC # FLD: 0 — SIGNIFICANT CHANGE UP
PHOSPHATE SERPL-MCNC: 3.2 MG/DL — SIGNIFICANT CHANGE UP (ref 2.5–4.5)
PLATELET # BLD AUTO: 371 K/UL — SIGNIFICANT CHANGE UP (ref 150–400)
PMV BLD: 8.6 FL — SIGNIFICANT CHANGE UP (ref 7–13)
POTASSIUM SERPL-MCNC: 3.9 MMOL/L — SIGNIFICANT CHANGE UP (ref 3.5–5.3)
POTASSIUM SERPL-SCNC: 3.9 MMOL/L — SIGNIFICANT CHANGE UP (ref 3.5–5.3)
PROT SERPL-MCNC: 6.6 G/DL — SIGNIFICANT CHANGE UP (ref 6–8.3)
RBC # BLD: 4.41 M/UL — SIGNIFICANT CHANGE UP (ref 3.8–5.2)
RBC # FLD: 13.2 % — SIGNIFICANT CHANGE UP (ref 10.3–14.5)
SODIUM SERPL-SCNC: 135 MMOL/L — SIGNIFICANT CHANGE UP (ref 135–145)
WBC # BLD: 7.56 K/UL — SIGNIFICANT CHANGE UP (ref 3.8–10.5)
WBC # FLD AUTO: 7.56 K/UL — SIGNIFICANT CHANGE UP (ref 3.8–10.5)

## 2018-04-01 PROCEDURE — 74183 MRI ABD W/O CNTR FLWD CNTR: CPT | Mod: 26

## 2018-04-01 PROCEDURE — 99231 SBSQ HOSP IP/OBS SF/LOW 25: CPT

## 2018-04-01 RX ORDER — ACETAMINOPHEN 500 MG
1000 TABLET ORAL ONCE
Qty: 0 | Refills: 0 | Status: DISCONTINUED | OUTPATIENT
Start: 2018-04-01 | End: 2018-04-03

## 2018-04-01 RX ORDER — ACETAMINOPHEN 500 MG
1000 TABLET ORAL ONCE
Qty: 0 | Refills: 0 | Status: COMPLETED | OUTPATIENT
Start: 2018-04-01 | End: 2018-04-01

## 2018-04-01 RX ORDER — POTASSIUM CHLORIDE 20 MEQ
10 PACKET (EA) ORAL ONCE
Qty: 0 | Refills: 0 | Status: COMPLETED | OUTPATIENT
Start: 2018-04-01 | End: 2018-04-01

## 2018-04-01 RX ORDER — ENOXAPARIN SODIUM 100 MG/ML
40 INJECTION SUBCUTANEOUS EVERY 24 HOURS
Qty: 0 | Refills: 0 | Status: DISCONTINUED | OUTPATIENT
Start: 2018-04-01 | End: 2018-04-07

## 2018-04-01 RX ADMIN — Medication 1000 MILLIGRAM(S): at 00:25

## 2018-04-01 RX ADMIN — Medication 400 MILLIGRAM(S): at 00:09

## 2018-04-01 RX ADMIN — Medication 100 MILLIEQUIVALENT(S): at 16:21

## 2018-04-01 NOTE — PROGRESS NOTE ADULT - ASSESSMENT
33F p/w gallstone pancreatitis likely from a passed stone. Patient will undergo MRCP today to assess the common bile duct. GI has been contacted regarding this patient and will be in communication regarding the patient's progression.    - F/u MRCP  - NPO with IVF for now.  - Monitor abdominal pain.   - IV nausea and pain control PRN  - Trend labs  - Strict I/O's

## 2018-04-01 NOTE — PROGRESS NOTE ADULT - SUBJECTIVE AND OBJECTIVE BOX
B Team Surgery Progress Note    SUBJECTIVE: Pt seen and examined at bedside. Patient comfortable and in no-apparent distress. No nausea, vomiting, diarrhea. Patient has continued pain but it's controlled with medications.       Vital Signs Last 24 Hrs  T(C): 36.9 (01 Apr 2018 10:34), Max: 37.1 (31 Mar 2018 15:39)  T(F): 98.5 (01 Apr 2018 10:34), Max: 98.7 (31 Mar 2018 15:39)  HR: 51 (01 Apr 2018 10:34) (51 - 81)  BP: 122/80 (01 Apr 2018 10:34) (97/61 - 127/76)  BP(mean): --  RR: 18 (01 Apr 2018 10:34) (16 - 18)  SpO2: 100% (01 Apr 2018 10:34) (95% - 100%)    Physical Exam:  General Appearance: Appears well, NAD  Respiratory: No labored breathing  CV: Pulse regularly present  Abdomen: Soft, nontense, TTP in the RUQ. nondistended.       LABS:                        12.1   7.56  )-----------( 371      ( 01 Apr 2018 05:40 )             35.5     04-01    135  |  101  |  6<L>  ----------------------------<  83  3.9   |  23  |  0.75    Ca    8.2<L>      01 Apr 2018 05:40  Phos  3.2     04-01  Mg     2.2     04-01    TPro  6.6  /  Alb  3.4  /  TBili  0.6  /  DBili  0.2  /  AST  71<H>  /  ALT  136<H>  /  AlkPhos  101  04-01          INs and OUTs:    03-31-18 @ 07:01  -  04-01-18 @ 07:00  --------------------------------------------------------  IN: 1000 mL / OUT: 0 mL / NET: 1000 mL

## 2018-04-02 LAB
ALBUMIN SERPL ELPH-MCNC: 3.4 G/DL — SIGNIFICANT CHANGE UP (ref 3.3–5)
ALP SERPL-CCNC: 92 U/L — SIGNIFICANT CHANGE UP (ref 40–120)
ALT FLD-CCNC: 92 U/L — HIGH (ref 4–33)
AST SERPL-CCNC: 32 U/L — SIGNIFICANT CHANGE UP (ref 4–32)
BILIRUB DIRECT SERPL-MCNC: 0.2 MG/DL — SIGNIFICANT CHANGE UP (ref 0.1–0.2)
BILIRUB SERPL-MCNC: 0.5 MG/DL — SIGNIFICANT CHANGE UP (ref 0.2–1.2)
BUN SERPL-MCNC: 9 MG/DL — SIGNIFICANT CHANGE UP (ref 7–23)
CALCIUM SERPL-MCNC: 8.6 MG/DL — SIGNIFICANT CHANGE UP (ref 8.4–10.5)
CHLORIDE SERPL-SCNC: 104 MMOL/L — SIGNIFICANT CHANGE UP (ref 98–107)
CO2 SERPL-SCNC: 20 MMOL/L — LOW (ref 22–31)
CREAT SERPL-MCNC: 0.76 MG/DL — SIGNIFICANT CHANGE UP (ref 0.5–1.3)
GLUCOSE SERPL-MCNC: 61 MG/DL — LOW (ref 70–99)
HCT VFR BLD CALC: 36.4 % — SIGNIFICANT CHANGE UP (ref 34.5–45)
HGB BLD-MCNC: 12.1 G/DL — SIGNIFICANT CHANGE UP (ref 11.5–15.5)
LIDOCAIN IGE QN: 114.5 U/L — HIGH (ref 7–60)
MAGNESIUM SERPL-MCNC: 2.2 MG/DL — SIGNIFICANT CHANGE UP (ref 1.6–2.6)
MCHC RBC-ENTMCNC: 27.1 PG — SIGNIFICANT CHANGE UP (ref 27–34)
MCHC RBC-ENTMCNC: 33.2 % — SIGNIFICANT CHANGE UP (ref 32–36)
MCV RBC AUTO: 81.4 FL — SIGNIFICANT CHANGE UP (ref 80–100)
NRBC # FLD: 0 — SIGNIFICANT CHANGE UP
PHOSPHATE SERPL-MCNC: 2.7 MG/DL — SIGNIFICANT CHANGE UP (ref 2.5–4.5)
PLATELET # BLD AUTO: 382 K/UL — SIGNIFICANT CHANGE UP (ref 150–400)
PMV BLD: 8.9 FL — SIGNIFICANT CHANGE UP (ref 7–13)
POTASSIUM SERPL-MCNC: 3.9 MMOL/L — SIGNIFICANT CHANGE UP (ref 3.5–5.3)
POTASSIUM SERPL-SCNC: 3.9 MMOL/L — SIGNIFICANT CHANGE UP (ref 3.5–5.3)
PROT SERPL-MCNC: 7 G/DL — SIGNIFICANT CHANGE UP (ref 6–8.3)
RBC # BLD: 4.47 M/UL — SIGNIFICANT CHANGE UP (ref 3.8–5.2)
RBC # FLD: 13.4 % — SIGNIFICANT CHANGE UP (ref 10.3–14.5)
SODIUM SERPL-SCNC: 139 MMOL/L — SIGNIFICANT CHANGE UP (ref 135–145)
WBC # BLD: 5.87 K/UL — SIGNIFICANT CHANGE UP (ref 3.8–10.5)
WBC # FLD AUTO: 5.87 K/UL — SIGNIFICANT CHANGE UP (ref 3.8–10.5)

## 2018-04-02 PROCEDURE — 99231 SBSQ HOSP IP/OBS SF/LOW 25: CPT

## 2018-04-02 RX ORDER — DOCUSATE SODIUM 100 MG
1 CAPSULE ORAL
Qty: 0 | Refills: 0 | COMMUNITY
Start: 2018-04-02

## 2018-04-02 RX ADMIN — SODIUM CHLORIDE 100 MILLILITER(S): 9 INJECTION, SOLUTION INTRAVENOUS at 22:13

## 2018-04-02 RX ADMIN — SODIUM CHLORIDE 100 MILLILITER(S): 9 INJECTION, SOLUTION INTRAVENOUS at 04:54

## 2018-04-02 NOTE — PROGRESS NOTE ADULT - ASSESSMENT
33F p/w gallstone pancreatitis likely from a passed stone. Patient will undergo MRCP today to assess the common bile duct. GI has been contacted regarding this patient and will be in communication regarding the patient's progression.    - f/u final read of MRCP  - NPO with IVF for now.  - Monitor abdominal pain.   - IV nausea and pain control PRN  - Trend labs  - Strict I/O's

## 2018-04-02 NOTE — PROGRESS NOTE ADULT - SUBJECTIVE AND OBJECTIVE BOX
B Team Surgery Progress Note    SUBJECTIVE: Pt seen and examined at bedside. Patient comfortable and in no-apparent distress. No nausea, vomiting, diarrhea. Pain is controlled.       Vital Signs Last 24 Hrs  T(C): 36.8 (02 Apr 2018 05:00), Max: 36.9 (01 Apr 2018 10:34)  T(F): 98.3 (02 Apr 2018 05:00), Max: 98.5 (01 Apr 2018 10:34)  HR: 62 (02 Apr 2018 05:00) (51 - 62)  BP: 129/65 (02 Apr 2018 05:00) (110/65 - 129/65)  BP(mean): --  RR: 17 (02 Apr 2018 05:00) (16 - 18)  SpO2: 99% (02 Apr 2018 05:00) (98% - 100%)    Physical Exam:  General Appearance: Appears well, NAD  Respiratory: No labored breathing  CV: Pulse regularly present  Abdomen: Soft, nontense, nontender in the RUQ. nondistended.       LABS:                        12.1   5.87  )-----------( 382      ( 02 Apr 2018 07:06 )             36.4     04-02    139  |  104  |  9   ----------------------------<  61<L>  3.9   |  20<L>  |  0.76    Ca    8.6      02 Apr 2018 07:06  Phos  2.7     04-02  Mg     2.2     04-02    TPro  7.0  /  Alb  3.4  /  TBili  0.5  /  DBili  0.2  /  AST  32  /  ALT  92<H>  /  AlkPhos  92  04-02          INs and OUTs:    04-01-18 @ 07:01  -  04-02-18 @ 07:00  --------------------------------------------------------  IN: 2600 mL / OUT: 2700 mL / NET: -100 mL

## 2018-04-03 ENCOUNTER — TRANSCRIPTION ENCOUNTER (OUTPATIENT)
Age: 33
End: 2018-04-03

## 2018-04-03 LAB
ALBUMIN SERPL ELPH-MCNC: 3.6 G/DL — SIGNIFICANT CHANGE UP (ref 3.3–5)
ALP SERPL-CCNC: 96 U/L — SIGNIFICANT CHANGE UP (ref 40–120)
ALT FLD-CCNC: 70 U/L — HIGH (ref 4–33)
AST SERPL-CCNC: 20 U/L — SIGNIFICANT CHANGE UP (ref 4–32)
BILIRUB SERPL-MCNC: 0.5 MG/DL — SIGNIFICANT CHANGE UP (ref 0.2–1.2)
BLD GP AB SCN SERPL QL: NEGATIVE — SIGNIFICANT CHANGE UP
BUN SERPL-MCNC: 8 MG/DL — SIGNIFICANT CHANGE UP (ref 7–23)
CALCIUM SERPL-MCNC: 8.9 MG/DL — SIGNIFICANT CHANGE UP (ref 8.4–10.5)
CHLORIDE SERPL-SCNC: 102 MMOL/L — SIGNIFICANT CHANGE UP (ref 98–107)
CO2 SERPL-SCNC: 21 MMOL/L — LOW (ref 22–31)
CREAT SERPL-MCNC: 0.76 MG/DL — SIGNIFICANT CHANGE UP (ref 0.5–1.3)
GLUCOSE SERPL-MCNC: 57 MG/DL — LOW (ref 70–99)
HCG UR-SCNC: NEGATIVE — SIGNIFICANT CHANGE UP
HCT VFR BLD CALC: 38.8 % — SIGNIFICANT CHANGE UP (ref 34.5–45)
HGB BLD-MCNC: 12.7 G/DL — SIGNIFICANT CHANGE UP (ref 11.5–15.5)
LIDOCAIN IGE QN: 57.6 U/L — SIGNIFICANT CHANGE UP (ref 7–60)
MAGNESIUM SERPL-MCNC: 2.1 MG/DL — SIGNIFICANT CHANGE UP (ref 1.6–2.6)
MCHC RBC-ENTMCNC: 26.6 PG — LOW (ref 27–34)
MCHC RBC-ENTMCNC: 32.7 % — SIGNIFICANT CHANGE UP (ref 32–36)
MCV RBC AUTO: 81.2 FL — SIGNIFICANT CHANGE UP (ref 80–100)
NRBC # FLD: 0 — SIGNIFICANT CHANGE UP
PHOSPHATE SERPL-MCNC: 3.4 MG/DL — SIGNIFICANT CHANGE UP (ref 2.5–4.5)
PLATELET # BLD AUTO: 410 K/UL — HIGH (ref 150–400)
PMV BLD: 9.1 FL — SIGNIFICANT CHANGE UP (ref 7–13)
POTASSIUM SERPL-MCNC: 4 MMOL/L — SIGNIFICANT CHANGE UP (ref 3.5–5.3)
POTASSIUM SERPL-SCNC: 4 MMOL/L — SIGNIFICANT CHANGE UP (ref 3.5–5.3)
PROT SERPL-MCNC: 7.3 G/DL — SIGNIFICANT CHANGE UP (ref 6–8.3)
RBC # BLD: 4.78 M/UL — SIGNIFICANT CHANGE UP (ref 3.8–5.2)
RBC # FLD: 13.2 % — SIGNIFICANT CHANGE UP (ref 10.3–14.5)
RH IG SCN BLD-IMP: POSITIVE — SIGNIFICANT CHANGE UP
SODIUM SERPL-SCNC: 137 MMOL/L — SIGNIFICANT CHANGE UP (ref 135–145)
SP GR UR: 1.01 — SIGNIFICANT CHANGE UP (ref 1–1.03)
WBC # BLD: 5.77 K/UL — SIGNIFICANT CHANGE UP (ref 3.8–10.5)
WBC # FLD AUTO: 5.77 K/UL — SIGNIFICANT CHANGE UP (ref 3.8–10.5)

## 2018-04-03 PROCEDURE — 99231 SBSQ HOSP IP/OBS SF/LOW 25: CPT

## 2018-04-03 RX ADMIN — SODIUM CHLORIDE 100 MILLILITER(S): 9 INJECTION, SOLUTION INTRAVENOUS at 21:27

## 2018-04-03 NOTE — PROGRESS NOTE ADULT - ASSESSMENT
33F p/w gallstone pancreatitis likely from a passed stone. Patient will undergo MRCP today to assess the common bile duct. GI has been contacted regarding this patient and will be in communication regarding the patient's progression.    Patient will be pre-op'd and consented for OR tomorrow. She may not want the operation for cosmetic reasons, will discuss with patient/attending further.     - CLD, NPOpMN  - Monitor abdominal pain.   - IV nausea and pain control PRN  - Trend labs  - Strict I/O's 33F p/w gallstone pancreatitis likely from a passed stone. Patient will undergo MRCP today to assess the common bile duct. GI has been contacted regarding this patient and will be in communication regarding the patient's progression.    - Pre-op and consent  - CLD, NPOpMN  - Monitor abdominal pain.   - IV nausea and pain control PRN  - Trend labs  - Strict I/O's

## 2018-04-03 NOTE — PROGRESS NOTE ADULT - SUBJECTIVE AND OBJECTIVE BOX
B Team Surgery Progress Note    SUBJECTIVE: Pt seen and examined at bedside. Patient comfortable and in no-apparent distress. No nausea, vomiting, diarrhea. Pain is controlled.       Vital Signs Last 24 Hrs  T(C): 36.8 (03 Apr 2018 10:16), Max: 37.3 (02 Apr 2018 18:03)  T(F): 98.3 (03 Apr 2018 10:16), Max: 99.2 (02 Apr 2018 18:03)  HR: 69 (03 Apr 2018 10:16) (50 - 90)  BP: 138/91 (03 Apr 2018 10:16) (120/79 - 152/75)  BP(mean): --  RR: 18 (03 Apr 2018 10:16) (18 - 19)  SpO2: 100% (03 Apr 2018 10:16) (99% - 100%)    Physical Exam:  General Appearance: Appears well, NAD  Respiratory: No labored breathing  CV: Pulse regularly present  Abdomen: Soft, nontense, NTND      LABS:                        12.7   5.77  )-----------( 410      ( 03 Apr 2018 06:30 )             38.8     04-03    137  |  102  |  8   ----------------------------<  57<L>  4.0   |  21<L>  |  0.76    Ca    8.9      03 Apr 2018 06:30  Phos  3.4     04-03  Mg     2.1     04-03    TPro  7.3  /  Alb  3.6  /  TBili  0.5  /  DBili  x   /  AST  20  /  ALT  70<H>  /  AlkPhos  96  04-03          INs and OUTs:    04-02-18 @ 07:01  -  04-03-18 @ 07:00  --------------------------------------------------------  IN: 1100 mL / OUT: 1750 mL / NET: -650 mL    04-03-18 @ 07:01  -  04-03-18 @ 10:30  --------------------------------------------------------  IN: 0 mL / OUT: 250 mL / NET: -250 mL

## 2018-04-04 ENCOUNTER — RESULT REVIEW (OUTPATIENT)
Age: 33
End: 2018-04-04

## 2018-04-04 LAB
APTT BLD: 39 SEC — HIGH (ref 27.5–37.4)
BUN SERPL-MCNC: 5 MG/DL — LOW (ref 7–23)
CALCIUM SERPL-MCNC: 8.9 MG/DL — SIGNIFICANT CHANGE UP (ref 8.4–10.5)
CHLORIDE SERPL-SCNC: 99 MMOL/L — SIGNIFICANT CHANGE UP (ref 98–107)
CO2 SERPL-SCNC: 24 MMOL/L — SIGNIFICANT CHANGE UP (ref 22–31)
CREAT SERPL-MCNC: 0.72 MG/DL — SIGNIFICANT CHANGE UP (ref 0.5–1.3)
GLUCOSE SERPL-MCNC: 81 MG/DL — SIGNIFICANT CHANGE UP (ref 70–99)
HCG UR-SCNC: NEGATIVE — SIGNIFICANT CHANGE UP
HCT VFR BLD CALC: 37.7 % — SIGNIFICANT CHANGE UP (ref 34.5–45)
HGB BLD-MCNC: 12.8 G/DL — SIGNIFICANT CHANGE UP (ref 11.5–15.5)
INR BLD: 1.1 — SIGNIFICANT CHANGE UP (ref 0.88–1.17)
MAGNESIUM SERPL-MCNC: 2 MG/DL — SIGNIFICANT CHANGE UP (ref 1.6–2.6)
MCHC RBC-ENTMCNC: 27.4 PG — SIGNIFICANT CHANGE UP (ref 27–34)
MCHC RBC-ENTMCNC: 34 % — SIGNIFICANT CHANGE UP (ref 32–36)
MCV RBC AUTO: 80.6 FL — SIGNIFICANT CHANGE UP (ref 80–100)
NRBC # FLD: 0 — SIGNIFICANT CHANGE UP
PHOSPHATE SERPL-MCNC: 3.4 MG/DL — SIGNIFICANT CHANGE UP (ref 2.5–4.5)
PLATELET # BLD AUTO: 456 K/UL — HIGH (ref 150–400)
PMV BLD: 8.9 FL — SIGNIFICANT CHANGE UP (ref 7–13)
POTASSIUM SERPL-MCNC: 3.7 MMOL/L — SIGNIFICANT CHANGE UP (ref 3.5–5.3)
POTASSIUM SERPL-SCNC: 3.7 MMOL/L — SIGNIFICANT CHANGE UP (ref 3.5–5.3)
PROTHROM AB SERPL-ACNC: 12.2 SEC — SIGNIFICANT CHANGE UP (ref 9.8–13.1)
RBC # BLD: 4.68 M/UL — SIGNIFICANT CHANGE UP (ref 3.8–5.2)
RBC # FLD: 13 % — SIGNIFICANT CHANGE UP (ref 10.3–14.5)
RH IG SCN BLD-IMP: POSITIVE — SIGNIFICANT CHANGE UP
SODIUM SERPL-SCNC: 137 MMOL/L — SIGNIFICANT CHANGE UP (ref 135–145)
SP GR UR: 1.01 — SIGNIFICANT CHANGE UP (ref 1–1.03)
WBC # BLD: 4.45 K/UL — SIGNIFICANT CHANGE UP (ref 3.8–10.5)
WBC # FLD AUTO: 4.45 K/UL — SIGNIFICANT CHANGE UP (ref 3.8–10.5)

## 2018-04-04 PROCEDURE — 74022 RADEX COMPL AQT ABD SERIES: CPT | Mod: 26

## 2018-04-04 PROCEDURE — 99231 SBSQ HOSP IP/OBS SF/LOW 25: CPT

## 2018-04-04 PROCEDURE — 88304 TISSUE EXAM BY PATHOLOGIST: CPT | Mod: 26

## 2018-04-04 RX ORDER — NALOXONE HYDROCHLORIDE 4 MG/.1ML
0.1 SPRAY NASAL
Qty: 0 | Refills: 0 | Status: DISCONTINUED | OUTPATIENT
Start: 2018-04-04 | End: 2018-04-06

## 2018-04-04 RX ORDER — ONDANSETRON 8 MG/1
4 TABLET, FILM COATED ORAL EVERY 6 HOURS
Qty: 0 | Refills: 0 | Status: DISCONTINUED | OUTPATIENT
Start: 2018-04-04 | End: 2018-04-06

## 2018-04-04 RX ORDER — HYDROMORPHONE HYDROCHLORIDE 2 MG/ML
0.5 INJECTION INTRAMUSCULAR; INTRAVENOUS; SUBCUTANEOUS
Qty: 0 | Refills: 0 | Status: DISCONTINUED | OUTPATIENT
Start: 2018-04-04 | End: 2018-04-04

## 2018-04-04 RX ORDER — HYDROMORPHONE HYDROCHLORIDE 2 MG/ML
1 INJECTION INTRAMUSCULAR; INTRAVENOUS; SUBCUTANEOUS
Qty: 0 | Refills: 0 | Status: DISCONTINUED | OUTPATIENT
Start: 2018-04-04 | End: 2018-04-04

## 2018-04-04 RX ORDER — HYDROMORPHONE HYDROCHLORIDE 2 MG/ML
30 INJECTION INTRAMUSCULAR; INTRAVENOUS; SUBCUTANEOUS
Qty: 0 | Refills: 0 | Status: DISCONTINUED | OUTPATIENT
Start: 2018-04-04 | End: 2018-04-06

## 2018-04-04 RX ADMIN — HYDROMORPHONE HYDROCHLORIDE 30 MILLILITER(S): 2 INJECTION INTRAMUSCULAR; INTRAVENOUS; SUBCUTANEOUS at 19:11

## 2018-04-04 RX ADMIN — HYDROMORPHONE HYDROCHLORIDE 30 MILLILITER(S): 2 INJECTION INTRAMUSCULAR; INTRAVENOUS; SUBCUTANEOUS at 20:24

## 2018-04-04 RX ADMIN — SODIUM CHLORIDE 125 MILLILITER(S): 9 INJECTION, SOLUTION INTRAVENOUS at 20:21

## 2018-04-04 NOTE — PROGRESS NOTE ADULT - SUBJECTIVE AND OBJECTIVE BOX
B Team Surgery Progress Note    SUBJECTIVE: Pt seen and examined at bedside. Patient comfortable and in no-apparent distress - PCA in place. No nausea, vomiting, diarrhea. Pain is controlled.       Vital Signs Last 24 Hrs  T(C): 37.1 (04 Apr 2018 19:15), Max: 37.3 (04 Apr 2018 18:20)  T(F): 98.8 (04 Apr 2018 19:15), Max: 99.1 (04 Apr 2018 18:20)  HR: 60 (04 Apr 2018 19:15) (55 - 605)  BP: 139/73 (04 Apr 2018 19:15) (105/57 - 158/84)  BP(mean): --  RR: 15 (04 Apr 2018 19:15) (14 - 20)  SpO2: 97% (04 Apr 2018 19:15) (96% - 100%)    Physical Exam:  General Appearance: Appears well, NAD  Respiratory: No labored breathing  CV: Pulse regularly present  Abdomen: Soft, nontense, incisional tenderness, incision CDI      LABS:                        12.8   4.45  )-----------( 456      ( 04 Apr 2018 07:35 )             37.7     04-04    137  |  99  |  5<L>  ----------------------------<  81  3.7   |  24  |  0.72    Ca    8.9      04 Apr 2018 07:35  Phos  3.4     04-04  Mg     2.0     04-04    TPro  7.3  /  Alb  3.6  /  TBili  0.5  /  DBili  x   /  AST  20  /  ALT  70<H>  /  AlkPhos  96  04-03    PT/INR - ( 04 Apr 2018 07:35 )   PT: 12.2 SEC;   INR: 1.10          PTT - ( 04 Apr 2018 07:35 )  PTT:39.0 SEC      INs and OUTs:    04-03-18 @ 07:01  -  04-04-18 @ 07:00  --------------------------------------------------------  IN: 1200 mL / OUT: 2300 mL / NET: -1100 mL    04-04-18 @ 07:01  -  04-04-18 @ 22:35  --------------------------------------------------------  IN: 0 mL / OUT: 650 mL / NET: -650 mL

## 2018-04-04 NOTE — PROGRESS NOTE ADULT - SUBJECTIVE AND OBJECTIVE BOX
B Team Surgery Progress Note    SUBJECTIVE: Pt seen and examined at bedside. Patient comfortable and in no-apparent distress. No nausea, vomiting, diarrhea. Pain is controlled.       Vital Signs Last 24 Hrs  T(C): 36.9 (04 Apr 2018 07:21), Max: 37.1 (03 Apr 2018 20:50)  T(F): 98.4 (04 Apr 2018 07:21), Max: 98.8 (03 Apr 2018 20:50)  HR: 55 (04 Apr 2018 07:21) (50 - 77)  BP: 124/81 (04 Apr 2018 07:21) (124/81 - 149/86)  BP(mean): --  RR: 17 (04 Apr 2018 07:21) (16 - 18)  SpO2: 100% (04 Apr 2018 07:21) (100% - 100%)    Physical Exam:  General Appearance: Appears well, NAD  Respiratory: No labored breathing  CV: Pulse regularly present  Abdomen: Soft, nontense, NTND      LABS:                        12.8   4.45  )-----------( 456      ( 04 Apr 2018 07:35 )             37.7     04-04    137  |  99  |  5<L>  ----------------------------<  81  3.7   |  24  |  0.72    Ca    8.9      04 Apr 2018 07:35  Phos  3.4     04-04  Mg     2.0     04-04    TPro  7.3  /  Alb  3.6  /  TBili  0.5  /  DBili  x   /  AST  20  /  ALT  70<H>  /  AlkPhos  96  04-03    PT/INR - ( 04 Apr 2018 07:35 )   PT: 12.2 SEC;   INR: 1.10          PTT - ( 04 Apr 2018 07:35 )  PTT:39.0 SEC      INs and OUTs:    04-03-18 @ 07:01  -  04-04-18 @ 07:00  --------------------------------------------------------  IN: 1200 mL / OUT: 2300 mL / NET: -1100 mL

## 2018-04-04 NOTE — PROGRESS NOTE ADULT - ASSESSMENT
33F s/p laparoscopic converted to open cholecystectomy with intraoperative IOC. Patient is aware she will need an ERCP tomorrow.    - PCA for pain control  - DVT ppx lovenox  - IVF in perioperative period  - NPO   - Zofran IV for nausea control PRN

## 2018-04-04 NOTE — PROGRESS NOTE ADULT - ASSESSMENT
33F p/w gallstone pancreatitis likely from a passed stone. Patient will undergo MRCP today to assess the common bile duct. GI has been contacted regarding this patient and will be in communication regarding the patient's progression.    - OR today for lap deonna  - NPO for procedure  - Pre-op'd and consented  - Monitor abdominal pain.   - IV nausea and pain control PRN  - Trend labs  - Strict I/O's

## 2018-04-04 NOTE — BRIEF OPERATIVE NOTE - POST-OP DX
Acute cholecystitis  04/04/2018    Active  Daivd Paulino  Gallstone pancreatitis  04/04/2018    Active  David Paulino

## 2018-04-04 NOTE — BRIEF OPERATIVE NOTE - PROCEDURE
Open cholecystectomy  04/04/2018  laparoscopic converted to open cholecystectomy with intraoperative IOC  Active  [id number] <<-----Click on this checkbox to enter Procedure

## 2018-04-05 PROCEDURE — 74328 X-RAY BILE DUCT ENDOSCOPY: CPT | Mod: 26,GC

## 2018-04-05 PROCEDURE — 43264 ERCP REMOVE DUCT CALCULI: CPT | Mod: GC

## 2018-04-05 PROCEDURE — 99231 SBSQ HOSP IP/OBS SF/LOW 25: CPT

## 2018-04-05 RX ORDER — ACETAMINOPHEN 500 MG
1000 TABLET ORAL ONCE
Qty: 0 | Refills: 0 | Status: COMPLETED | OUTPATIENT
Start: 2018-04-05 | End: 2018-04-05

## 2018-04-05 RX ORDER — ACETAMINOPHEN 500 MG
1000 TABLET ORAL ONCE
Qty: 0 | Refills: 0 | Status: COMPLETED | OUTPATIENT
Start: 2018-04-06 | End: 2018-04-06

## 2018-04-05 RX ADMIN — HYDROMORPHONE HYDROCHLORIDE 30 MILLILITER(S): 2 INJECTION INTRAMUSCULAR; INTRAVENOUS; SUBCUTANEOUS at 20:23

## 2018-04-05 RX ADMIN — Medication 1000 MILLIGRAM(S): at 06:45

## 2018-04-05 RX ADMIN — Medication 400 MILLIGRAM(S): at 06:45

## 2018-04-05 RX ADMIN — HYDROMORPHONE HYDROCHLORIDE 30 MILLILITER(S): 2 INJECTION INTRAMUSCULAR; INTRAVENOUS; SUBCUTANEOUS at 08:38

## 2018-04-05 NOTE — PROGRESS NOTE ADULT - ASSESSMENT
33F s/p laparoscopic converted to open cholecystectomy with intraoperative IOC. Patient is aware she needs ERCP today.    - PCA for pain control  - DVT ppx lovenox  - IVF in perioperative period  - NPO   - ERCP today

## 2018-04-05 NOTE — PROGRESS NOTE ADULT - SUBJECTIVE AND OBJECTIVE BOX
Day __2_ of Anesthesia Pain Management Service    Allergies    No Known Drug Allergies  shrimp (Hives; Swelling)    Intolerances        SUBJECTIVE: "I'm doing ok"     Pain Scale Score	At rest: _2__ 	With Activity: ___ 	[ ] Refer to charted pain scores    THERAPY:    [ ] IV PCA Morphine		[ ] 5 mg/mL	[ ] 1 mg/mL  [X] IV PCA Hydromorphone	[ ] 5 mg/mL	[X] 1 mg/mL  [ ] IV PCA Fentanyl		[ ] 50 micrograms/mL    Demand dose _0.2mg_ lockout _6_ (minutes) Continuous Rate _0_ Total: _1mg__  Daily      MEDICATIONS  (STANDING):  acetaminophen  IVPB. 1000 milliGRAM(s) IV Intermittent once  acetaminophen  IVPB. 1000 milliGRAM(s) IV Intermittent once  enoxaparin Injectable 40 milliGRAM(s) SubCutaneous every 24 hours  HYDROmorphone PCA (1 mG/mL) 30 milliLiter(s) PCA Continuous PCA Continuous  lactated ringers. 1000 milliLiter(s) (125 mL/Hr) IV Continuous <Continuous>    MEDICATIONS  (PRN):  naloxone Injectable 0.1 milliGRAM(s) IV Push every 3 minutes PRN For ANY of the following changes in patient status:  A. RR LESS THAN 10 breaths per minute, B. Oxygen saturation LESS THAN 90%, C. Sedation score of 6  ondansetron Injectable 4 milliGRAM(s) IV Push every 6 hours PRN Nausea      OBJECTIVE: A&Ox3, NAD, supine in bed    Sedation Score:	[X] Alert	[ ] Drowsy	[ ] Arousable	[ ] Asleep	[ ] Unresponsive    Side Effects:	[X] None	[ ] Nausea	[ ] Vomiting	[ ] Pruritus  		  [ ] Weakness		[ ] Numbness	[ ] Other:    PT/INR - ( 04 Apr 2018 07:35 )   PT: 12.2 SEC;   INR: 1.10          PTT - ( 04 Apr 2018 07:35 )  PTT:39.0 SEC                          12.8   4.45  )-----------( 456      ( 04 Apr 2018 07:35 )             37.7       04-04    137  |  99  |  5<L>  ----------------------------<  81  3.7   |  24  |  0.72    Ca    8.9      04 Apr 2018 07:35  Phos  3.4     04-04  Mg     2.0     04-04        ASSESSMENT/ PLAN    Therapy to  be:	[X] Continue   [ ] Discontinued   [ ] Change to prn Analgesics    Documentation and Verification of current medications:  [X] Done	[ ] Not done, not eligible  [ ] Not done, reason not given    [ ]  [place]  Reviewed and Copied to Chart    Comments: NPO continue current therapy

## 2018-04-05 NOTE — CONSULT NOTE ADULT - ASSESSMENT
32 yo woman with recent episode of gallstone pancreatitis now day 1 s/p cholecystectomy found to have CBD stones on IOC. Plan for ERCP today.

## 2018-04-05 NOTE — PROGRESS NOTE ADULT - SUBJECTIVE AND OBJECTIVE BOX
GENERAL SURGERY PROGRESS NOTE    POST OPERATIVE DAY #: 1      SUBJECTIVE: Pt seen and examined at bedside. Pain is controlled with PCA. Denies N/V, fever, chills, SOB, CP.     Vital Signs Last 24 Hrs  T(C): 36.7 (05 Apr 2018 06:50), Max: 37.3 (04 Apr 2018 18:20)  T(F): 98 (05 Apr 2018 06:50), Max: 99.1 (04 Apr 2018 18:20)  HR: 59 (05 Apr 2018 06:50) (52 - 98)  BP: 134/67 (05 Apr 2018 06:50) (105/57 - 158/84)  BP(mean): --  RR: 17 (05 Apr 2018 06:50) (14 - 20)  SpO2: 97% (05 Apr 2018 06:50) (96% - 100%)    Physical Exam  General: awake, alert  Pulm: respirations unlabored, no increased WOB  Abdomen: Dressing in place, tender.  Extremities: Grossly symmetric    I&O's Summary    04 Apr 2018 07:01  -  05 Apr 2018 07:00  --------------------------------------------------------  IN: 1500 mL / OUT: 1050 mL / NET: 450 mL      I&O's Detail    04 Apr 2018 07:01  -  05 Apr 2018 07:00  --------------------------------------------------------  IN:    lactated ringers.: 1500 mL  Total IN: 1500 mL    OUT:    Voided: 1050 mL  Total OUT: 1050 mL    Total NET: 450 mL          MEDICATIONS  (STANDING):  acetaminophen  IVPB. 1000 milliGRAM(s) IV Intermittent once  acetaminophen  IVPB. 1000 milliGRAM(s) IV Intermittent once  enoxaparin Injectable 40 milliGRAM(s) SubCutaneous every 24 hours  HYDROmorphone PCA (1 mG/mL) 30 milliLiter(s) PCA Continuous PCA Continuous  lactated ringers. 1000 milliLiter(s) (125 mL/Hr) IV Continuous <Continuous>    MEDICATIONS  (PRN):  naloxone Injectable 0.1 milliGRAM(s) IV Push every 3 minutes PRN For ANY of the following changes in patient status:  A. RR LESS THAN 10 breaths per minute, B. Oxygen saturation LESS THAN 90%, C. Sedation score of 6  ondansetron Injectable 4 milliGRAM(s) IV Push every 6 hours PRN Nausea      LABS:                        12.8   4.45  )-----------( 456      ( 04 Apr 2018 07:35 )             37.7     04-04    137  |  99  |  5<L>  ----------------------------<  81  3.7   |  24  |  0.72    Ca    8.9      04 Apr 2018 07:35  Phos  3.4     04-04  Mg     2.0     04-04      PT/INR - ( 04 Apr 2018 07:35 )   PT: 12.2 SEC;   INR: 1.10          PTT - ( 04 Apr 2018 07:35 )  PTT:39.0 SEC      RADIOLOGY & ADDITIONAL STUDIES:

## 2018-04-05 NOTE — CONSULT NOTE ADULT - SUBJECTIVE AND OBJECTIVE BOX
Chief Complaint:  Patient is a 33y old  Female who presents with a chief complaint of Abdominal pain (31 Mar 2018 22:04)      HPI: 34 yo woman with GERD and recent admission to [facility] for gallstone pancreatitis who presented to [facility] ED on 3/31 because of  intermittent, 10/10, crampy LUQ abdominal pain, associated with nausea and vomiting. She went to OR yesterday for lap cholecystectomy which was converted to open cholecystectomy due to significant inflammation. During the cholecystectomy an IOC was performed which showed CBD stones. GI consult was called for ERCP.       Allergies:  No Known Drug Allergies  shrimp (Hives; Swelling)      Home Medications:    Hospital Medications:  acetaminophen  IVPB. 1000 milliGRAM(s) IV Intermittent once  acetaminophen  IVPB. 1000 milliGRAM(s) IV Intermittent once  enoxaparin Injectable 40 milliGRAM(s) SubCutaneous every 24 hours  HYDROmorphone PCA (1 mG/mL) 30 milliLiter(s) PCA Continuous PCA Continuous  lactated ringers. 1000 milliLiter(s) IV Continuous <Continuous>  naloxone Injectable 0.1 milliGRAM(s) IV Push every 3 minutes PRN  ondansetron Injectable 4 milliGRAM(s) IV Push every 6 hours PRN      PMHX/PSHX:  Acid reflux  H/O:   No significant past surgical history      Family history:  No pertinent family history in first degree relatives      Social History:     ROS:     General:  No wt loss, fevers, chills, night sweats, fatigue   Eyes:  Good vision, no reported pain  ENT:  No sore throat, pain, runny nose  CV:  No chest pain, SOB, palpitations, orthopnea  Resp:  No cough, SOB, wheezing  GI:  See HPI  :  No pain, bleeding, incontinence, nocturia  Muscle:  No pain, weakness  Neuro:  No weakness, tingling, memory problems  Psych:  No fatigue, insomnia, mood problems, depression  Endocrine:  No cold/heat intolerance  Heme:  No petechiae, ecchymosis, easy bruising  Skin:  No rash, edema      PHYSICAL EXAM:     GENERAL: NAD  HEENT: sclera anicteric  CHEST: CTAB  HEART:  RRR, no MRG, no edema  ABDOMEN:  Soft, tender in epigastrium, non-distended, no masses, no hepatosplenomegaly  EXTREMITIES:  no cyanosis, or edema  SKIN:  No rash  NEURO:  Alert, orientedx3     Vital Signs:  Vital Signs Last 24 Hrs  T(C): 36.9 (2018 10:07), Max: 37.3 (2018 18:20)  T(F): 98.4 (2018 10:07), Max: 99.1 (2018 18:20)  HR: 55 (2018 10:07) (52 - 98)  BP: 119/70 (2018 10:07) (105/57 - 158/84)  RR: 17 (2018 10:07) (14 - 20)  SpO2: 98% (2018 10:07) (96% - 100%)  Daily     Daily Weight in k.4 (2018 01:34)    LABS:                        12.8   4.45  )-----------( 456      ( 2018 07:35 )             37.7     04-04    137  |  99  |  5<L>  ----------------------------<  81  3.7   |  24  |  0.72    Ca    8.9      2018 07:35  Phos  3.4     04-04  Mg     2.0     04-04    Hepatic Function Panel (18 @ 07:06)    Protein Total, Serum: 7.0: SPECIMEN NOT HEMOLYZED g/dL    Albumin, Serum: 3.4 g/dL    Bilirubin Total, Serum: 0.5 mg/dL    Bilirubin Direct, Serum: 0.2 mg/dL    Alkaline Phosphatase, Serum: 92 u/L    Aspartate Aminotransferase (AST/SGOT): 32: SPECIMEN NOT HEMOLYZED u/L    Alanine Aminotransferase (ALT/SGPT): 92: SPECIMEN NOT HEMOLYZED u/L        PT/INR - ( 2018 07:35 )   PT: 12.2 SEC;   INR: 1.10       Lipase, Serum in AM (18 @ 06:30)    Lipase, Serum: 57.6 U/L       PTT - ( 2018 07:35 )  PTT:39.0 SEC    Imaging:  < from: MR MRCP w/wo IV Cont (18 @ 18:24) >  LIVER: 2.8 cm hemangioma segment 6.  BILE DUCTS: Normal caliber.  GALLBLADDER: Multiple small gallstones.  SPLEEN: Within normal limits.  PANCREAS: Within normal limits.    < end of copied text >

## 2018-04-05 NOTE — PROGRESS NOTE ADULT - SUBJECTIVE AND OBJECTIVE BOX
Anesthesia Pain Management Service- Attending Addendum    SUBJECTIVE: Pt doing well with IV PCA without problems reported.    Therapy:	  [ X] IV PCA	   [ ] Epidural           [ ] s/p Spinal Opoid              [ ] Postpartum infusion	  [ ] Patient controlled regional anesthesia (PCRA)    [ ] prn Analgesics    Allergies    No Known Drug Allergies  shrimp (Hives; Swelling)    Intolerances      MEDICATIONS  (STANDING):  acetaminophen  IVPB. 1000 milliGRAM(s) IV Intermittent once  enoxaparin Injectable 40 milliGRAM(s) SubCutaneous every 24 hours  HYDROmorphone PCA (1 mG/mL) 30 milliLiter(s) PCA Continuous PCA Continuous  lactated ringers. 1000 milliLiter(s) (125 mL/Hr) IV Continuous <Continuous>    MEDICATIONS  (PRN):  naloxone Injectable 0.1 milliGRAM(s) IV Push every 3 minutes PRN For ANY of the following changes in patient status:  A. RR LESS THAN 10 breaths per minute, B. Oxygen saturation LESS THAN 90%, C. Sedation score of 6  ondansetron Injectable 4 milliGRAM(s) IV Push every 6 hours PRN Nausea      OBJECTIVE:   [X] No new signs     [ ] Other:    Side Effects:  [X ] None			[ ] Other:    Assessment of Catheter Site:		[ ] Intact		[ ] Other:    ASSESSMENT/PLAN  [ X] Continue current therapy    [ ] Therapy changed to:    [ ] IV PCA       [ ] Epidural     [ ] prn Analgesics     Comments:

## 2018-04-06 LAB
BUN SERPL-MCNC: 4 MG/DL — LOW (ref 7–23)
CALCIUM SERPL-MCNC: 8.2 MG/DL — LOW (ref 8.4–10.5)
CHLORIDE SERPL-SCNC: 101 MMOL/L — SIGNIFICANT CHANGE UP (ref 98–107)
CO2 SERPL-SCNC: 25 MMOL/L — SIGNIFICANT CHANGE UP (ref 22–31)
CREAT SERPL-MCNC: 0.79 MG/DL — SIGNIFICANT CHANGE UP (ref 0.5–1.3)
GLUCOSE SERPL-MCNC: 96 MG/DL — SIGNIFICANT CHANGE UP (ref 70–99)
HCT VFR BLD CALC: 32.1 % — LOW (ref 34.5–45)
HGB BLD-MCNC: 10.8 G/DL — LOW (ref 11.5–15.5)
MAGNESIUM SERPL-MCNC: 1.8 MG/DL — SIGNIFICANT CHANGE UP (ref 1.6–2.6)
MCHC RBC-ENTMCNC: 27.2 PG — SIGNIFICANT CHANGE UP (ref 27–34)
MCHC RBC-ENTMCNC: 33.6 % — SIGNIFICANT CHANGE UP (ref 32–36)
MCV RBC AUTO: 80.9 FL — SIGNIFICANT CHANGE UP (ref 80–100)
NRBC # FLD: 0 — SIGNIFICANT CHANGE UP
PHOSPHATE SERPL-MCNC: 3.3 MG/DL — SIGNIFICANT CHANGE UP (ref 2.5–4.5)
PLATELET # BLD AUTO: 387 K/UL — SIGNIFICANT CHANGE UP (ref 150–400)
PMV BLD: 8.9 FL — SIGNIFICANT CHANGE UP (ref 7–13)
POTASSIUM SERPL-MCNC: 3.5 MMOL/L — SIGNIFICANT CHANGE UP (ref 3.5–5.3)
POTASSIUM SERPL-SCNC: 3.5 MMOL/L — SIGNIFICANT CHANGE UP (ref 3.5–5.3)
RBC # BLD: 3.97 M/UL — SIGNIFICANT CHANGE UP (ref 3.8–5.2)
RBC # FLD: 12.9 % — SIGNIFICANT CHANGE UP (ref 10.3–14.5)
SODIUM SERPL-SCNC: 139 MMOL/L — SIGNIFICANT CHANGE UP (ref 135–145)
WBC # BLD: 6.2 K/UL — SIGNIFICANT CHANGE UP (ref 3.8–10.5)
WBC # FLD AUTO: 6.2 K/UL — SIGNIFICANT CHANGE UP (ref 3.8–10.5)

## 2018-04-06 PROCEDURE — 99232 SBSQ HOSP IP/OBS MODERATE 35: CPT | Mod: GC

## 2018-04-06 PROCEDURE — 99231 SBSQ HOSP IP/OBS SF/LOW 25: CPT

## 2018-04-06 RX ORDER — ACETAMINOPHEN 500 MG
650 TABLET ORAL EVERY 6 HOURS
Qty: 0 | Refills: 0 | Status: DISCONTINUED | OUTPATIENT
Start: 2018-04-07 | End: 2018-04-07

## 2018-04-06 RX ORDER — HYDROMORPHONE HYDROCHLORIDE 2 MG/ML
0.5 INJECTION INTRAMUSCULAR; INTRAVENOUS; SUBCUTANEOUS
Qty: 0 | Refills: 0 | Status: DISCONTINUED | OUTPATIENT
Start: 2018-04-06 | End: 2018-04-07

## 2018-04-06 RX ORDER — ACETAMINOPHEN 500 MG
1000 TABLET ORAL ONCE
Qty: 0 | Refills: 0 | Status: COMPLETED | OUTPATIENT
Start: 2018-04-06 | End: 2018-04-06

## 2018-04-06 RX ORDER — MAGNESIUM SULFATE 500 MG/ML
2 VIAL (ML) INJECTION ONCE
Qty: 0 | Refills: 0 | Status: COMPLETED | OUTPATIENT
Start: 2018-04-06 | End: 2018-04-06

## 2018-04-06 RX ORDER — SODIUM CHLORIDE 9 MG/ML
1000 INJECTION, SOLUTION INTRAVENOUS
Qty: 0 | Refills: 0 | Status: DISCONTINUED | OUTPATIENT
Start: 2018-04-06 | End: 2018-04-06

## 2018-04-06 RX ORDER — OXYCODONE HYDROCHLORIDE 5 MG/1
5 TABLET ORAL
Qty: 0 | Refills: 0 | Status: DISCONTINUED | OUTPATIENT
Start: 2018-04-06 | End: 2018-04-07

## 2018-04-06 RX ORDER — POTASSIUM CHLORIDE 20 MEQ
10 PACKET (EA) ORAL ONCE
Qty: 0 | Refills: 0 | Status: COMPLETED | OUTPATIENT
Start: 2018-04-06 | End: 2018-04-06

## 2018-04-06 RX ORDER — POTASSIUM CHLORIDE 20 MEQ
20 PACKET (EA) ORAL ONCE
Qty: 0 | Refills: 0 | Status: COMPLETED | OUTPATIENT
Start: 2018-04-06 | End: 2018-04-06

## 2018-04-06 RX ORDER — ACETAMINOPHEN 500 MG
1000 TABLET ORAL ONCE
Qty: 0 | Refills: 0 | Status: COMPLETED | OUTPATIENT
Start: 2018-04-07 | End: 2018-04-07

## 2018-04-06 RX ADMIN — Medication 1000 MILLIGRAM(S): at 01:20

## 2018-04-06 RX ADMIN — Medication 20 MILLIEQUIVALENT(S): at 18:51

## 2018-04-06 RX ADMIN — Medication 50 GRAM(S): at 11:03

## 2018-04-06 RX ADMIN — Medication 400 MILLIGRAM(S): at 18:51

## 2018-04-06 RX ADMIN — HYDROMORPHONE HYDROCHLORIDE 30 MILLILITER(S): 2 INJECTION INTRAMUSCULAR; INTRAVENOUS; SUBCUTANEOUS at 08:26

## 2018-04-06 RX ADMIN — SODIUM CHLORIDE 50 MILLILITER(S): 9 INJECTION, SOLUTION INTRAVENOUS at 11:03

## 2018-04-06 RX ADMIN — SODIUM CHLORIDE 125 MILLILITER(S): 9 INJECTION, SOLUTION INTRAVENOUS at 00:17

## 2018-04-06 RX ADMIN — Medication 400 MILLIGRAM(S): at 00:13

## 2018-04-06 NOTE — CHART NOTE - NSCHARTNOTEFT_GEN_A_CORE
Post Operative Check    Patient is s/p ERCP and is seen at bedside.  Denies chest pain, dyspnea, nausea, vomiting.    Vitals    T(C): 36.3 (04-06-18 @ 01:31), Max: 36.9 (04-05-18 @ 10:07)  HR: 53 (04-06-18 @ 01:31) (53 - 68)  BP: 123/70 (04-06-18 @ 01:31) (110/90 - 134/67)  RR: 17 (04-06-18 @ 01:31) (17 - 18)  SpO2: 99% (04-06-18 @ 01:31) (96% - 100%)      04-04 @ 07:01  -  04-05 @ 07:00  --------------------------------------------------------  IN:    lactated ringers.: 1500 mL  Total IN: 1500 mL    OUT:    Voided: 1050 mL  Total OUT: 1050 mL    Total NET: 450 mL      04-05 @ 07:01  -  04-06 @ 02:04  --------------------------------------------------------  IN:    lactated ringers.: 1375 mL  Total IN: 1375 mL    OUT:    Voided: 1350 mL  Total OUT: 1350 mL    Total NET: 25 mL          Labs                        12.8   4.45  )-----------( 456      ( 04 Apr 2018 07:35 )             37.7       CBC Full  -  ( 04 Apr 2018 07:35 )  WBC Count : 4.45 K/uL  Hemoglobin : 12.8 g/dL  Hematocrit : 37.7 %  Platelet Count - Automated : 456 K/uL  Mean Cell Volume : 80.6 fL  Mean Cell Hemoglobin : 27.4 pg  Mean Cell Hemoglobin Concentration : 34.0 %  Auto Neutrophil # : x  Auto Lymphocyte # : x  Auto Monocyte # : x  Auto Eosinophil # : x  Auto Basophil # : x  Auto Neutrophil % : x  Auto Lymphocyte % : x  Auto Monocyte % : x  Auto Eosinophil % : x  Auto Basophil % : x      Physical Exam  General: Lying in bed, NAD  CV: RRR  Pulm: clear, no increased work of breathing  Abdomen: soft, ND, NT  Extremities: warm and well perfused. Grossly intact.      Patient is a 33y old Female s/p     - Pain control  - DVT ppx  - F/u AM labs Post Operative Check    Patient is s/p ERCP and is seen at bedside.  Denies chest pain, dyspnea, nausea, vomiting. Has pain with coughing.    Vitals    T(C): 36.3 (04-06-18 @ 01:31), Max: 36.9 (04-05-18 @ 10:07)  HR: 53 (04-06-18 @ 01:31) (53 - 68)  BP: 123/70 (04-06-18 @ 01:31) (110/90 - 134/67)  RR: 17 (04-06-18 @ 01:31) (17 - 18)  SpO2: 99% (04-06-18 @ 01:31) (96% - 100%)      04-04 @ 07:01  -  04-05 @ 07:00  --------------------------------------------------------  IN:    lactated ringers.: 1500 mL  Total IN: 1500 mL    OUT:    Voided: 1050 mL  Total OUT: 1050 mL    Total NET: 450 mL      04-05 @ 07:01  -  04-06 @ 02:04  --------------------------------------------------------  IN:    lactated ringers.: 1375 mL  Total IN: 1375 mL    OUT:    Voided: 1350 mL  Total OUT: 1350 mL    Total NET: 25 mL          Labs                        12.8   4.45  )-----------( 456      ( 04 Apr 2018 07:35 )             37.7       CBC Full  -  ( 04 Apr 2018 07:35 )  WBC Count : 4.45 K/uL  Hemoglobin : 12.8 g/dL  Hematocrit : 37.7 %  Platelet Count - Automated : 456 K/uL  Mean Cell Volume : 80.6 fL  Mean Cell Hemoglobin : 27.4 pg  Mean Cell Hemoglobin Concentration : 34.0 %  Auto Neutrophil # : x  Auto Lymphocyte # : x  Auto Monocyte # : x  Auto Eosinophil # : x  Auto Basophil # : x  Auto Neutrophil % : x  Auto Lymphocyte % : x  Auto Monocyte % : x  Auto Eosinophil % : x  Auto Basophil % : x      Physical Exam  General: Lying in bed, NAD  CV: RRR  Pulm: clear, no increased work of breathing  Abdomen: mildly distended, tender, incision with staples c/d/i  Extremities: warm and well perfused. Grossly intact.      Patient is a 33y old Female s/p ERCP    - Pain control  - DVT ppx  - F/u AM labs  - OOB/Ambulate

## 2018-04-06 NOTE — PROGRESS NOTE ADULT - SUBJECTIVE AND OBJECTIVE BOX
Chief Complaint:  Patient is a 33y old  Female who presents with a chief complaint of Abdominal pain (31 Mar 2018 22:04)      Interval Events:   - patient had ERCP yesterday  - no fevers overnight    Allergies:  No Known Drug Allergies  shrimp (Hives; Swelling)      Hospital Medications:  acetaminophen  IVPB. 1000 milliGRAM(s) IV Intermittent once  acetaminophen  IVPB. 1000 milliGRAM(s) IV Intermittent once  enoxaparin Injectable 40 milliGRAM(s) SubCutaneous every 24 hours  HYDROmorphone  Injectable 0.5 milliGRAM(s) IV Push every 3 hours PRN  lactated ringers. 1000 milliLiter(s) IV Continuous <Continuous>  naloxone Injectable 0.1 milliGRAM(s) IV Push every 3 minutes PRN  ondansetron Injectable 4 milliGRAM(s) IV Push every 6 hours PRN  oxyCODONE    Solution 5 milliGRAM(s) Oral every 3 hours PRN      PMHX/PSHX:  Acid reflux  H/O:   No significant past surgical history      Family history:  No pertinent family history in first degree relatives      ROS:     General:  No wt loss, fevers, chills, night sweats, fatigue   Eyes:  Good vision, no reported pain  ENT:  No sore throat, pain, runny nose  CV:  No chest pain, palpitations  Resp:  No cough, wheezing, SOB  GI:  See HPI  :  No pain, bleeding, incontinence, nocturia  Muscle:  No pain, weakness  Neuro:  No weakness, tingling, memory problems  Psych:  No fatigue, insomnia, mood problems, depression  Endocrine:  No cold/heat intolerance  Heme:  No petechiae, ecchymosis, easy bruising  Skin:  No rash, edema      PHYSICAL EXAM:   Vital Signs:  Vital Signs Last 24 Hrs  T(C): 36.6 (2018 05:56), Max: 36.9 (2018 14:08)  T(F): 97.8 (2018 05:56), Max: 98.5 (2018 14:08)  HR: 52 (2018 05:56) (52 - 68)  BP: 135/98 (2018 05:56) (110/90 - 135/98)  RR: 17 (2018 05:56) (17 - 18)  SpO2: 99% (2018 05:56) (96% - 100%)  Daily     Daily Weight in k (2018 01:31)    GENERAL:  NAD  HEENT:  sclera anicteric  CHEST:  no respiratory distress, CTAB  HEART:  RRR, no MRG, no edema  ABDOMEN:  Soft, staple line in RUQ, tender over RUQ, non-distended    EXTREMITIES:  no cyanosis, no edema  SKIN:  No rash  NEURO:  Alert, oriented      LABS:                        10.8   6.20  )-----------( 387      ( 2018 04:43 )             32.1     -    139  |  101  |  4<L>  ----------------------------<  96  3.5   |  25  |  0.79    Ca    8.2<L>      2018 04:43  Phos  3.3     -  Mg     1.8         Imaging:  < from: ERCP (18 @ 16:11) >  ERCP:       -The  film showed RUQ clips.       -The duodenoscope was advanced to a normal appearing ampulla.       -The bile duct was cannulated using a Rx sphincterotome with a 260 cm        0.035 inch wire.       -The pancreatic duct was neither cannulated or injected.       -The bile duct was injected with contrast. i acquired and interpreted        the fluoroscopic images. Images were downloaded to PACS.       -The bile duct was 10 mm. No gross filling defects seen. The bile duct        was swept with a 12 mm balloon yielding sludge. An occlusion        cholangiogram showed no defects.                                               Impression:          - Endoscopic retrograde cholangiopancreatography with                        sphincterotomy and sludge removal.  Recommendation:      - [facility].                       - NPO                       - IVF                       - Cipro x 5 days    < end of copied text > Chief Complaint:  Patient is a 33y old  Female who presents with a chief complaint of Abdominal pain (31 Mar 2018 22:04)      Interval Events:   - patient had ERCP yesterday  - no fevers overnight  - she endorses moderate pain at the incision site  - she denies nausea, vomiting or dyspepsia    Allergies:  No Known Drug Allergies  shrimp (Hives; Swelling)      Hospital Medications:  acetaminophen  IVPB. 1000 milliGRAM(s) IV Intermittent once  acetaminophen  IVPB. 1000 milliGRAM(s) IV Intermittent once  enoxaparin Injectable 40 milliGRAM(s) SubCutaneous every 24 hours  HYDROmorphone  Injectable 0.5 milliGRAM(s) IV Push every 3 hours PRN  lactated ringers. 1000 milliLiter(s) IV Continuous <Continuous>  naloxone Injectable 0.1 milliGRAM(s) IV Push every 3 minutes PRN  ondansetron Injectable 4 milliGRAM(s) IV Push every 6 hours PRN  oxyCODONE    Solution 5 milliGRAM(s) Oral every 3 hours PRN      PMHX/PSHX:  Acid reflux  H/O:   No significant past surgical history      Family history:  No pertinent family history in first degree relatives      ROS:     General:  No wt loss, fevers, chills, night sweats, fatigue   Eyes:  Good vision, no reported pain  ENT:  No sore throat, pain, runny nose  CV:  No chest pain, palpitations  Resp:  No cough, wheezing, SOB  GI:  See HPI  :  No pain, bleeding, incontinence, nocturia  Muscle:  No pain, weakness  Neuro:  No weakness, tingling, memory problems  Psych:  No fatigue, insomnia, mood problems, depression  Endocrine:  No cold/heat intolerance  Heme:  No petechiae, ecchymosis, easy bruising  Skin:  No rash, edema      PHYSICAL EXAM:   Vital Signs:  Vital Signs Last 24 Hrs  T(C): 36.6 (2018 05:56), Max: 36.9 (2018 14:08)  T(F): 97.8 (2018 05:56), Max: 98.5 (2018 14:08)  HR: 52 (2018 05:56) (52 - 68)  BP: 135/98 (2018 05:56) (110/90 - 135/98)  RR: 17 (2018 05:56) (17 - 18)  SpO2: 99% (2018 05:56) (96% - 100%)  Daily     Daily Weight in k (2018 01:31)    GENERAL:  NAD  HEENT:  poor dentition, dsclera anicteric  CHEST:  no respiratory distress, CTAB  HEART:  RRR, no MRG, no edema  ABDOMEN:  Soft, staple line in RUQ, tender over RUQ, non-distended    EXTREMITIES:  no cyanosis, no edema  SKIN:  No rash  NEURO:  Alert, oriented      LABS:                        10.8   6.20  )-----------( 387      ( 2018 04:43 )             32.1     04-06    139  |  101  |  4<L>  ----------------------------<  96  3.5   |  25  |  0.79    Ca    8.2<L>      2018 04:43  Phos  3.3     04-06  Mg     1.8     -06    Imaging:  < from: ERCP (18 @ 16:11) >  ERCP:       -The  film showed RUQ clips.       -The duodenoscope was advanced to a normal appearing ampulla.       -The bile duct was cannulated using a Rx sphincterotome with a 260 cm        0.035 inch wire.       -The pancreatic duct was neither cannulated or injected.       -The bile duct was injected with contrast. i acquired and interpreted        the fluoroscopic images. Images were downloaded to PACS.       -The bile duct was 10 mm. No gross filling defects seen. The bile duct        was swept with a 12 mm balloon yielding sludge. An occlusion        cholangiogram showed no defects.                                               Impression:          - Endoscopic retrograde cholangiopancreatography with                        sphincterotomy and sludge removal.  Recommendation:      - Return patient to [facility] for [facility] care.                       - NPO                       - IVF                       - Cipro x 5 days    < end of copied text >

## 2018-04-06 NOTE — PROGRESS NOTE ADULT - ATTENDING COMMENTS
Above noted. Feels better, tolerating oral intake, passing flatus and had a bowel movement.  Physical Exam: Afebrile.  Abdomen: Soft, tender, incision clean.  Plan: Continue present management. If stable, discharge tomorrow.
Above noted. Feels weak. Passed flatus, no bowel movement.  Physical Exam: Afebrile.  Abdomen: Soft, non- tender.  Labs: Lipase 137 today. MRCP negative for choledocholithiasis.  Plan: NPO except for water, continue present management.
Above noted. Had mild pain earlier today, now asymptomatic. Denies passing flatus, no bowel movement.  Labs: Repeat lipase pending, WBC normal.  Plan: Continue NPO, IV fluids, DVT prophylaxis.
Above noted. Underwent an ERCP today (sludge- no calculi). Has incisional pain well controlled. Denies nausea.   Plan: Lipase, LFTs tomorrow. May have liquids tonight.
Above noted. Had two episodes of diarrhea today. Has abdominal pain related only to her menstrual cycle.  Labs: Lipase is normal  Plan: Laparoscopic, possible open cholecystectomy with IOC possible ERCP discussed in detail including risks: hemorrhage, infection, bile duct, intestinal injuries, pancreatitis.

## 2018-04-06 NOTE — PROGRESS NOTE ADULT - SUBJECTIVE AND OBJECTIVE BOX
Day _3_ of Anesthesia Pain Management Service    Allergies  No Known Drug Allergies  shrimp (Hives; Swelling)      SUBJECTIVE: "The medication makes me feel woozy and out of it."    Pain Scale Score	At rest: _3-4/10_ 	With Activity: ___ 	[ ] Refer to charted pain scores    THERAPY:    [ ] IV PCA Morphine		[ ] 5 mg/mL	[ ] 1 mg/mL  [X] IV PCA Hydromorphone	[ ] 5 mg/mL	[X] 1 mg/mL  [ ] IV PCA Fentanyl		[ ] 50 micrograms/mL    Demand dose _0.2mg_ lockout _6_ (minutes) Continuous Rate _0_ Total: _1.4mg_  Daily      MEDICATIONS  (STANDING):  acetaminophen  IVPB. 1000 milliGRAM(s) IV Intermittent once  acetaminophen  IVPB. 1000 milliGRAM(s) IV Intermittent once  enoxaparin Injectable 40 milliGRAM(s) SubCutaneous every 24 hours  lactated ringers. 1000 milliLiter(s) (125 mL/Hr) IV Continuous <Continuous>    MEDICATIONS  (PRN):  HYDROmorphone  Injectable 0.5 milliGRAM(s) IV Push every 3 hours PRN Severe Pain (7 - 10)  naloxone Injectable 0.1 milliGRAM(s) IV Push every 3 minutes PRN For ANY of the following changes in patient status:  A. RR LESS THAN 10 breaths per minute, B. Oxygen saturation LESS THAN 90%, C. Sedation score of 6  ondansetron Injectable 4 milliGRAM(s) IV Push every 6 hours PRN Nausea  oxyCODONE    Solution 5 milliGRAM(s) Oral every 3 hours PRN Moderate Pain (4 - 6)      OBJECTIVE: A&Ox3, NAD, sitting up in bed sipping apple juice.    Sedation Score:	[X] Alert	[ ] Drowsy	[ ] Arousable	[ ] Asleep	[ ] Unresponsive    Side Effects:	[X] None	[ ] Nausea	[ ] Vomiting	[ ] Pruritus  		  [ ] Weakness		[ ] Numbness	[ ] Other:                              10.8   6.20  )-----------( 387      ( 06 Apr 2018 04:43 )             32.1       04-06    139  |  101  |  4<L>  ----------------------------<  96  3.5   |  25  |  0.79    Ca    8.2<L>      06 Apr 2018 04:43  Phos  3.3     04-06  Mg     1.8     04-06        ASSESSMENT/ PLAN    Therapy to  be:	[] Continue   [x] Discontinued   [x] Change to prn Analgesics    Documentation and Verification of current medications:  [X] Done	[ ] Not done, not eligible  [ ] Not done, reason not given    Comments:  Tolerates clears without n/v  IV Tylenol q8hrs x 3doses  Oxycodone PRN analgesia  Dilaudid PRN severe pain.

## 2018-04-06 NOTE — PROGRESS NOTE ADULT - ASSESSMENT
34 yo woman with recent hospitalization for gallstone pancreatitis day 2 s/p cholecystectomy and day 1 s/p ERCP for filling defects seen on IOC. ERCP with sphincterotomy was successful with removal of small sludge from the bile duct. Continue abx for 5 day course. Diet per surgery team.

## 2018-04-06 NOTE — PROGRESS NOTE ADULT - ASSESSMENT
33F s/p laparoscopic converted to open cholecystectomy with intraoperative IOC. ERCP done yesterday.    - PCA for pain control  - DVT ppx lovenox  - Advance to regular diet today

## 2018-04-06 NOTE — PROGRESS NOTE ADULT - SUBJECTIVE AND OBJECTIVE BOX
GENERAL SURGERY PROGRESS NOTE    POST OPERATIVE DAY #: 2      SUBJECTIVE: Pt seen and examined at bedside. Had ERCP yesterday, no gallstones seen. Denies N/V, fever, chills, SOB, CP.     Vital Signs Last 24 Hrs  T(C): 36.6 (06 Apr 2018 05:56), Max: 36.9 (05 Apr 2018 10:07)  T(F): 97.8 (06 Apr 2018 05:56), Max: 98.5 (05 Apr 2018 14:08)  HR: 52 (06 Apr 2018 05:56) (52 - 68)  BP: 135/98 (06 Apr 2018 05:56) (110/90 - 135/98)  BP(mean): --  RR: 17 (06 Apr 2018 05:56) (17 - 18)  SpO2: 99% (06 Apr 2018 05:56) (96% - 100%)    Physical Exam  General: awake, alert  Pulm: respirations unlabored, no increased WOB  Abdomen: Incisions hemostatic, soft, tender  Extremities: Grossly symmetric    I&O's Summary    05 Apr 2018 07:01  -  06 Apr 2018 07:00  --------------------------------------------------------  IN: 2475 mL / OUT: 1650 mL / NET: 825 mL      I&O's Detail    05 Apr 2018 07:01  -  06 Apr 2018 07:00  --------------------------------------------------------  IN:    IV PiggyBack: 100 mL    lactated ringers.: 2375 mL  Total IN: 2475 mL    OUT:    Voided: 1650 mL  Total OUT: 1650 mL    Total NET: 825 mL          MEDICATIONS  (STANDING):  enoxaparin Injectable 40 milliGRAM(s) SubCutaneous every 24 hours  HYDROmorphone PCA (1 mG/mL) 30 milliLiter(s) PCA Continuous PCA Continuous  lactated ringers. 1000 milliLiter(s) (125 mL/Hr) IV Continuous <Continuous>    MEDICATIONS  (PRN):  naloxone Injectable 0.1 milliGRAM(s) IV Push every 3 minutes PRN For ANY of the following changes in patient status:  A. RR LESS THAN 10 breaths per minute, B. Oxygen saturation LESS THAN 90%, C. Sedation score of 6  ondansetron Injectable 4 milliGRAM(s) IV Push every 6 hours PRN Nausea      LABS:                        10.8   6.20  )-----------( 387      ( 06 Apr 2018 04:43 )             32.1     04-06    139  |  101  |  4<L>  ----------------------------<  96  3.5   |  25  |  0.79    Ca    8.2<L>      06 Apr 2018 04:43  Phos  3.3     04-06  Mg     1.8     04-06            RADIOLOGY & ADDITIONAL STUDIES:

## 2018-04-07 ENCOUNTER — TRANSCRIPTION ENCOUNTER (OUTPATIENT)
Age: 33
End: 2018-04-07

## 2018-04-07 VITALS
DIASTOLIC BLOOD PRESSURE: 95 MMHG | SYSTOLIC BLOOD PRESSURE: 144 MMHG | RESPIRATION RATE: 18 BRPM | HEART RATE: 66 BPM | OXYGEN SATURATION: 99 % | TEMPERATURE: 98 F

## 2018-04-07 LAB
BUN SERPL-MCNC: 4 MG/DL — LOW (ref 7–23)
CALCIUM SERPL-MCNC: 8.5 MG/DL — SIGNIFICANT CHANGE UP (ref 8.4–10.5)
CHLORIDE SERPL-SCNC: 102 MMOL/L — SIGNIFICANT CHANGE UP (ref 98–107)
CO2 SERPL-SCNC: 26 MMOL/L — SIGNIFICANT CHANGE UP (ref 22–31)
CREAT SERPL-MCNC: 0.77 MG/DL — SIGNIFICANT CHANGE UP (ref 0.5–1.3)
GLUCOSE SERPL-MCNC: 94 MG/DL — SIGNIFICANT CHANGE UP (ref 70–99)
HCT VFR BLD CALC: 34.4 % — LOW (ref 34.5–45)
HGB BLD-MCNC: 11.7 G/DL — SIGNIFICANT CHANGE UP (ref 11.5–15.5)
MAGNESIUM SERPL-MCNC: 2.3 MG/DL — SIGNIFICANT CHANGE UP (ref 1.6–2.6)
MCHC RBC-ENTMCNC: 27.1 PG — SIGNIFICANT CHANGE UP (ref 27–34)
MCHC RBC-ENTMCNC: 34 % — SIGNIFICANT CHANGE UP (ref 32–36)
MCV RBC AUTO: 79.6 FL — LOW (ref 80–100)
NRBC # FLD: 0 — SIGNIFICANT CHANGE UP
PHOSPHATE SERPL-MCNC: 2.7 MG/DL — SIGNIFICANT CHANGE UP (ref 2.5–4.5)
PLATELET # BLD AUTO: 408 K/UL — HIGH (ref 150–400)
PMV BLD: 8.6 FL — SIGNIFICANT CHANGE UP (ref 7–13)
POTASSIUM SERPL-MCNC: 3.6 MMOL/L — SIGNIFICANT CHANGE UP (ref 3.5–5.3)
POTASSIUM SERPL-SCNC: 3.6 MMOL/L — SIGNIFICANT CHANGE UP (ref 3.5–5.3)
RBC # BLD: 4.32 M/UL — SIGNIFICANT CHANGE UP (ref 3.8–5.2)
RBC # FLD: 13 % — SIGNIFICANT CHANGE UP (ref 10.3–14.5)
SODIUM SERPL-SCNC: 140 MMOL/L — SIGNIFICANT CHANGE UP (ref 135–145)
WBC # BLD: 5.14 K/UL — SIGNIFICANT CHANGE UP (ref 3.8–10.5)
WBC # FLD AUTO: 5.14 K/UL — SIGNIFICANT CHANGE UP (ref 3.8–10.5)

## 2018-04-07 RX ORDER — BENZOCAINE AND MENTHOL 5; 1 G/100ML; G/100ML
1 LIQUID ORAL ONCE
Qty: 0 | Refills: 0 | Status: COMPLETED | OUTPATIENT
Start: 2018-04-07 | End: 2018-04-07

## 2018-04-07 RX ADMIN — BENZOCAINE AND MENTHOL 1 LOZENGE: 5; 1 LIQUID ORAL at 03:36

## 2018-04-07 NOTE — DISCHARGE NOTE ADULT - PATIENT PORTAL LINK FT
You can access the [email or web address] Patient Portal, offered by [facility], by registering with the following website: [email or web address]

## 2018-04-07 NOTE — DISCHARGE NOTE ADULT - PLAN OF CARE
Resolution of pain and return to functional activity Please follow up with your surgeon regarding you plan of care.

## 2018-04-07 NOTE — DISCHARGE NOTE ADULT - CARE PLAN
Principal Discharge DX:	Gallstone pancreatitis  Goal:	Resolution of pain and return to functional activity  Assessment and plan of treatment:	Please follow up with your surgeon regarding you plan of care.

## 2018-04-07 NOTE — DISCHARGE NOTE ADULT - HOSPITAL COURSE
33y female - h/o GERD and  section 7 years prior - presents complaining of LUQ crampy abdominal pain x approximately 12 hours. She was recently admitted to [facility] (on Monday, 3/26/18) with diffuse abdominal pain. The workup performed there revealed gallstone pancreatitis (lipase max of ~3500, t. bili max of 2.5). She was managed conservatively, and then was due for an ERCP on Thursday, 3/29, but refused the procedure when she felt that she didn't understand the purpose of it. She was discharged home in no pain, and tolerating a regular diet. Early this AM, she began to feel intermittent, 10/10, crampy LUQ abdominal pain, which she initially thought was gas. After beginning to feel nauseated, she induced vomiting once, with no relief. She presented to the [facility] ED, where she had one further episode of emesis. She states that her pain has been controlled and she is no longer nauseous. Denies any fever/chills, no darkening urine, no chest pain/SOB, no constipation/obstipation/diarrhea.     Patient went to OR:  laparoscopic converted to open cholecystectomy with intraoperative IOC	  Patient underwent ERCP the following day  < from: ERCP (18 @ 16:11) >    - Endoscopic retrograde cholangiopancreatography with                        sphincterotomy and sludge removal.    < end of copied text >    Patient recovered on the floor. Tolerating diet, +GI function, Ambulating, pain controlled with PO meds. Will be discharged with outpatient follow up

## 2018-04-07 NOTE — DISCHARGE NOTE ADULT - MEDICATION SUMMARY - MEDICATIONS TO TAKE
I will START or STAY ON the medications listed below when I get home from the hospital:    oxyCODONE-acetaminophen 5 mg-325 mg oral tablet  -- 1 tab(s) by mouth every 6 hours, As Needed MDD:4  -- Caution federal law prohibits the transfer of this drug to any person other  than the person for whom it was prescribed.  May cause drowsiness.  Alcohol may intensify this effect.  Use care when operating dangerous machinery.  This prescription cannot be refilled.  This product contains acetaminophen.  Do not use  with any other product containing acetaminophen to prevent possible liver damage.  Using more of this medication than prescribed may cause serious breathing problems.    -- Indication: For pain    docusate sodium 100 mg oral capsule  -- 1 cap(s) by mouth 3 times a day  -- Indication: For constipation

## 2018-04-07 NOTE — DISCHARGE NOTE ADULT - CARE PROVIDER_API CALL
Joey Rodriguez), Surgery  [address] [facility]  [address] [place] [id number]  Phone: [phone]  Fax: [phone]

## 2018-04-07 NOTE — DISCHARGE NOTE ADULT - ADDITIONAL INSTRUCTIONS
WOUND CARE:  Please keep incisions clean and dry. Please do not Scrub or rub incisions. Do not use lotion or powder on incisions.   BATHING: Please do not submerge wound underwater. You may shower and/or sponge bathe.  ACTIVITY: No heavy lifting or straining. Otherwise, you may return to your usual level of physical activity. If you are taking narcotic pain medication (such as Percocet) DO NOT drive a car, operate machinery or make important decisions.  DIET: Return to your usual diet.  NOTIFY YOUR SURGEON IF: You have any bleeding that does not stop, any pus draining from your wound(s), any fever (over 100.4 F) or chills, persistent nausea/vomiting, persistent diarrhea, or if your pain is not controlled on your discharge pain medications.  FOLLOW-UP: Please follow up with your primary care physician in one week regarding your hospitalization. Please follow-up with your surgeon, within 7 days following discharge- please call to schedule an appointment.

## 2018-04-07 NOTE — PROGRESS NOTE ADULT - ASSESSMENT
33F s/p laparoscopic converted to open cholecystectomy with intraoperative IOC. ERCP done.    - Regular diet - tolerating  - PO pain medications  - DVT ppx  - Discharge now 33F s/p laparoscopic converted to open cholecystectomy with intraoperative IOC. ERCP done.    - Regular diet - tolerating  - PO pain medications  - DVT ppx  - Discharge today

## 2018-04-07 NOTE — PROGRESS NOTE ADULT - SUBJECTIVE AND OBJECTIVE BOX
B Team Surgery Progress Note    SUBJECTIVE: Pt seen and examined at bedside. Patient comfortable and in no-apparent distress. No nausea, vomiting, diarrhea. Pain is controlled.       Vital Signs Last 24 Hrs  T(C): 37.3 (07 Apr 2018 06:31), Max: 37.3 (07 Apr 2018 06:31)  T(F): 99.2 (07 Apr 2018 06:31), Max: 99.2 (07 Apr 2018 06:31)  HR: 54 (07 Apr 2018 06:31) (54 - 64)  BP: 151/92 (07 Apr 2018 06:31) (127/89 - 159/94)  BP(mean): --  RR: 18 (07 Apr 2018 06:31) (18 - 20)  SpO2: 98% (07 Apr 2018 06:31) (96% - 100%)    Physical Exam:  General Appearance: Appears well, NAD  Respiratory: No labored breathing  CV: Pulse regularly present  Abdomen: Soft, nontense,NTND, incisions CDI      LABS:                        11.7   5.14  )-----------( 408      ( 07 Apr 2018 07:46 )             34.4     04-07    140  |  102  |  4<L>  ----------------------------<  94  3.6   |  26  |  0.77    Ca    8.5      07 Apr 2018 07:46  Phos  2.7     04-07  Mg     2.3     04-07            INs and OUTs:    04-06-18 @ 07:01  -  04-07-18 @ 07:00  --------------------------------------------------------  IN: 50 mL / OUT: 1400 mL / NET: -1350 mL

## 2018-04-10 LAB — SURGICAL PATHOLOGY STUDY: SIGNIFICANT CHANGE UP

## 2018-04-11 ENCOUNTER — APPOINTMENT (OUTPATIENT)
Dept: SURGERY | Facility: CLINIC | Age: 33
End: 2018-04-11
Payer: COMMERCIAL

## 2018-04-11 VITALS
TEMPERATURE: 98.4 F | HEIGHT: 63 IN | SYSTOLIC BLOOD PRESSURE: 135 MMHG | BODY MASS INDEX: 31.89 KG/M2 | DIASTOLIC BLOOD PRESSURE: 92 MMHG | WEIGHT: 180 LBS | HEART RATE: 80 BPM

## 2018-04-11 DIAGNOSIS — Z87.19 PERSONAL HISTORY OF OTHER DISEASES OF THE DIGESTIVE SYSTEM: ICD-10-CM

## 2018-04-11 DIAGNOSIS — Z82.49 FAMILY HISTORY OF ISCHEMIC HEART DISEASE AND OTHER DISEASES OF THE CIRCULATORY SYSTEM: ICD-10-CM

## 2018-04-11 DIAGNOSIS — Z80.6 FAMILY HISTORY OF LEUKEMIA: ICD-10-CM

## 2018-04-11 DIAGNOSIS — Z09 ENCOUNTER FOR FOLLOW-UP EXAMINATION AFTER COMPLETED TREATMENT FOR CONDITIONS OTHER THAN MALIGNANT NEOPLASM: ICD-10-CM

## 2018-04-11 DIAGNOSIS — Z80.3 FAMILY HISTORY OF MALIGNANT NEOPLASM OF BREAST: ICD-10-CM

## 2018-04-11 DIAGNOSIS — Z78.9 OTHER SPECIFIED HEALTH STATUS: ICD-10-CM

## 2018-04-11 PROCEDURE — 99024 POSTOP FOLLOW-UP VISIT: CPT

## 2018-04-24 ENCOUNTER — APPOINTMENT (OUTPATIENT)
Dept: GASTROENTEROLOGY | Facility: CLINIC | Age: 33
End: 2018-04-24
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
## 2019-09-28 NOTE — PATIENT PROFILE ADULT. - MEDICATIONS BROUGHT TO HOSPITAL, PROFILE
Patient:   Madeline Thompson            MRN: [id number]            FIN: [id number]               Age:   63 years     Sex:  MALE     :  61   Associated Diagnoses:   None   Author:   Sharla Greene Hospitalist        ADMIT DATE: 2017    DISCHARGE DATE: 2017     PCP: After Kristal Chanr  DATE PCP NOTIFIED:  METHOD OF NOTIFICATION:    CONSULTING PHYSICIAN(s):  Orthopedist: Dr. Rasta Tian  Infectious disease:Dr Oli Perdue ON DISCHARGE: Stable     DISCHARGE DIAGNOSIS:   Status post irrigation and debridement of the left thumb incision and irrigation of the flexor tendon sheath secondary to left thumb infection and abscess  Hypertension  COPD in active every day smoker  Hyperlipidemia    CODE STATUS: Full code    PROCEDURES PERFORMED DURING ADMISSION AND DATES PERFORMED: See above    HOSPITAL COURSE:    20-year-old male patient with a past medical history significant for History of esophageal cancer status post esophagectomy in , COPD, heart of hearing, hypertension, hyperlipidemia, pancreatitis, skin cancer, degenerative disc disease no lumbar spine who presented to the emergency room left hand pain and swelling. Patient has been admitted for left hand infection, rule out tenosynovitis. According to the patient, he had been doing some yard work yesterday and got a few scratches on his left hand. He woke up about 2 AM with pain in his left thumb and hand. It has gotten progressively worse and so  he came to ED. No fever. But he did feel chills for about 2 hours. ED COURSE:  Admitting Diagnosis: Left hand infection, rule out tenosynovitis. Emergency room vital signs were remarkable for an elevated blood pressure of 180/98. He was afebrile temperature 37.2 heart rate was 73 respirations 16 O2 sat 100 percent. Labs were done and were remarkable for a sodium level of 134. Chloride was slightly low at 97 as well.  CBC was remarkable for an elevated white count 14. Hemoglobin 12.1. He underwent x-ray of his left hand which showed no acute fracture or malalignment. There was degenerative changes of the thumb IP joint. Patient was treated for pain with morphine in the emergency room. He was treated for possible infection with Zosyn and vancomycin. He has been admitted with the hand surgeon on consult. ER records were all reviewed past medical records reviewed. Patient was examined. He was neurovascularly intact. Pain control was made available with IV and oral analgesics. He was instructed to elevate his left hand above heart level. The orthopedist was consulted and discussed with Dr. Alisha Cullen. We continued his antibiotics including Zosyn and vancomycin. Monitor his vital signs and white blood count. Home medications were reviewed and continued as appropriate. Patient was placed on the [facility] patch to help with any withdrawal symptoms as he is an active every day smoker. Patient was followed and examined again the next day and it. That the swelling and erythema was worse including the pain. Ultimately the orthopedist decided to take the patient to surgery where he underwent an I&D of the left thumb and incision and drainage and irrigation of the flexor tendon sheath as well. This helped considerably and almost immediately well into the next day swelling improved dramatically as well as the erythema. Pain control was continued with the IV pain medication and patient was transition to oral as he tolerated it. Infectious disease was consulted and ultimately the bacteria was found to be group a strep. He will need to be continued on Rocephin for a total of 4 weeks. A midline IV was placed for this purposes. Home care was set up. The patient's white blood cell count returned to normal. Patient did not run a fever. His pain was much improved. He was neurovascularly intact. Sensation returned to baseline.  All the patient's questions were answered and he is in agreement with the plan. All of his chronic medical problems were stable and medications were continued at discharge.     DISCHARGE MEDICATION LIST   Allergies: No known allergies     MEDICATION  DOSE  ROUTE  FREQUENCY  SPECIAL INSTRUCTIONS   acetaminophen (Tylenol Extra Strength oral 500 mg tablet)  1,500 mg=3 tab  Oral  Every 4 hours As Needed: for severe back pain     acetaminophen-HYDROcodone (Norco oral 325-10 mg tablet)  1 tab  Oral  Every 4 hours As Needed: pain moderate     amLODIPine (Norvasc oral 5 mg tablet)  5 mg=1 tab  Oral  Every morning     beclomethasone nasal (Qnasl 80 mcg/inh nasal spray)  2 spray  IntraNasal  Every morning     buPROPion (Wellbutrin XL oral 300 mg/24 hours XL tablet)  300 mg=1 tab  Oral  Every morning     cefTRIAXone (cefTRIAXone 2 g/50 mL intravenous solution)  2,000 mg=50 mL  IVPB  Every morning at 0600     cyanocobalamin (Vitamin B12)  1,000 mcg  Oral  Twice daily     diphenhydrAMINE (diphenhydrAMINE oral 50 mg capsule (Benadryl))  50 mg=1 cap  Oral  Nightly at bedtime     docusate (docusate sodium 100 mg oral tablet)  100 mg=1 tab  Oral  Daily As Needed: as needed for constipation     esomeprazole (NexIUM 40 mg oral delayed release capsule)  40 mg=1 cap  Oral  Twice daily     fenofibrate (fenofibrate oral 160 mg tablet)  160 mg=1 tab  Oral  Every morning     ferrous sulfate (ferrous sulfate 324 mg (65 mg elemental iron) oral delayed release tablet)  324 mg=1 tab  Oral  Twice daily     fluoride topical (PreviDent 5000 Sensitive topical paste)  1 application  Topical  Twice daily     Freetext Home Medication (Tenzin Leg Cramp)  2 tab  Oral  Daily As Needed: as needed for leg cramps     Freetext Home Medication (Nopal Cactus 1000 mg)  1 tab  Oral  Every morning     Freetext Home Medication (Super Snooze with Melatonin)  1 tab  Oral  Nightly at bedtime     Freetext Home Medication (Tumeric)  1 tab  Oral  Twice daily     gabapentin (gabapentin oral 300 mg capsule)  300 mg=1 cap Oral  Nightly at bedtime     hydroCHLOROthiazide-valsartan (Diovan  mg-12.5 mg oral tablet)  1 tab  Oral  Every morning     ibuprofen (ibuprofen oral 200 mg tablet)  600 mg=3 tab  Oral  Every 4 hours As Needed: for severe back pain     latanoprost ophthalmic (latanoprost ophthalmic 0.005% solution)  1 drop  Each Eye  Nightly at bedtime     melatonin (Melatonin 5 mg oral tablet)  5 mg=1 tab  Oral  Nightly at bedtime As Needed: for insomnia     meloxicam (meloxicam oral 15 mg tablet (Mobic))  15 mg=1 tab  Oral  Daily     metaxalone (metaxalone oral 800 mg tablet)  800 mg=1 tab  Oral  Four times daily     montelukast (Singulair oral 10 mg tablet)  10 mg=1 tab  Oral  Every evening     multivitamin (multivitamin oral tablet)  1 tab  Oral  Daily     saccharomyces boulardii lyo (Florastor oral 250 mg capsule)  250 mg=1 cap  Oral  Twice daily As Needed: for loose stool     simvastatin (simvastatin oral 20 mg tablet)  20 mg=1 tab  Oral  Nightly at bedtime     traMADol (traMADol oral 50 mg tablet)  50 mg=1 tab  Oral  Twice daily     valACYclovir (valACYclovir 1 g oral tablet)  2 gm=2 tab  Oral  Twice daily As Needed: as needed for cold sores     zolpidem (zolpidem oral 10 mg tablet)  10 mg=1 tab  Oral  Nightly at bedtime       NEW MEDICATIONS / MEDICATIONS DISCONTINUED / DOSAGE CHANGES DURING THIS ADMISSION:    DISCHARGE INSTRUCTIONS:  DISCHARGE TO: Home with home healthcare  DIET: General  ACTIVITY: As tolerated left lower extremity elevated    PENDING TEST RESULTS AND PROBLEMS NEEDING F/U:  Per ID    FOLLOW-UP APPOINTMENTS:   Follow with primary care physician the next 3-5 days  Follow-up with infectious disease in 2 weeks  Follow up with orthopedist in one week    PHYSICAL EXAMINATION:   GENERAL: Alert and oriented  RESPIRATORY: Clear  CARDIOVASCULAR: Regular rate and rhythm  GASTROINTESTINAL: Bowel sounds present. Abdomen soft. MUSCULOSKELETAL: No Tenderness.   INTEGUMENTARY: No rash  NEUROLOGIC: Grossly intact  PSYCHIATRIC: Appropriate and cooperative      Vitals between:   03-AUG-2017 12:51:01   TO   04-AUG-2017 12:51:01                   LAST RESULT MINIMUM MAXIMUM  Temperature 36.7 36.6 37  Heart Rate 82 81 91  Respiratory Rate 18 16 18  NISBP           143 143 163  NIDBP           83 83 89  SpO2                    95 95 100                Objective   General:  Alert and oriented. Respiratory:  Lungs are clear to auscultation. Cardiovascular:  Normal rate, Regular rhythm, No murmur. Gastrointestinal:  Soft, Non-tender, Non-distended, Normal bowel sounds. Musculoskeletal:  Neurovascularly intact. No calf tenderness. Integumentary:  Warm, Dry. Neurologic:  Alert, Oriented. Psychiatric:  Cooperative, Appropriate mood & affect. Results Review   General results   Interpretation:   Lab Results Last 24 Hours    No Qualifying Labs are resulted on this patient in the last 24 hours      Radiology Results Last 24 Hours  . Carolyn Mohan Seen with and discussed with my attending      Rogers Guerrero PA-C  [facility]   [phone]       Pt seen and examined independently of Rogers Guerrero (PA). Reviewed discharge summary above, and agree with note. Case and plan discussed with PA.                   Electronically Signed On 08/05/2017 14:02  __________________________________________________   Bola Dhillon      Electronically Signed On 08/07/2017 21:08  __________________________________________________   Jacquie Hamman no

## 2019-11-10 NOTE — ED ADULT TRIAGE NOTE - DIRECT TO ROOM CARE INITIATED:
26-Mar-2018 09:45
Implemented All Fall Risk Interventions:  [place] to call system. Call bell, personal items and telephone within reach. Instruct patient to call for assistance. Room bathroom lighting operational. Non-slip footwear when patient is off stretcher. Physically safe environment: no spills, clutter or unnecessary equipment. Stretcher in lowest position, wheels locked, appropriate side rails in place. Provide visual cue, wrist band, yellow gown, etc. Monitor gait and stability. Monitor for mental status changes and reorient to person, place, and time. Review medications for side effects contributing to fall risk. Reinforce activity limits and safety measures with patient and family.

## 2020-08-12 NOTE — PATIENT PROFILE ADULT. - CAREGIVER RELATION TO PATIENT
Is This A New Presentation, Or A Follow-Up?: Skin Lesions How Severe Is Your Skin Lesion?: mild Have Your Skin Lesions Been Treated?: not been treated Other

## 2020-09-15 ENCOUNTER — EMERGENCY (EMERGENCY)
Facility: HOSPITAL | Age: 35
LOS: 1 days | Discharge: HOME | End: 2020-09-15
Attending: EMERGENCY MEDICINE | Admitting: EMERGENCY MEDICINE
Payer: COMMERCIAL

## 2020-09-15 VITALS
OXYGEN SATURATION: 100 % | TEMPERATURE: 98 F | RESPIRATION RATE: 20 BRPM | DIASTOLIC BLOOD PRESSURE: 87 MMHG | HEART RATE: 94 BPM | SYSTOLIC BLOOD PRESSURE: 121 MMHG

## 2020-09-15 VITALS
DIASTOLIC BLOOD PRESSURE: 100 MMHG | TEMPERATURE: 99 F | HEART RATE: 106 BPM | OXYGEN SATURATION: 100 % | HEIGHT: 62 IN | RESPIRATION RATE: 18 BRPM | SYSTOLIC BLOOD PRESSURE: 143 MMHG

## 2020-09-15 DIAGNOSIS — Z98.891 HISTORY OF UTERINE SCAR FROM PREVIOUS SURGERY: Chronic | ICD-10-CM

## 2020-09-15 LAB
ANION GAP SERPL CALC-SCNC: 12 MMO/L — SIGNIFICANT CHANGE UP (ref 7–14)
BASOPHILS # BLD AUTO: 0.03 K/UL — SIGNIFICANT CHANGE UP (ref 0–0.2)
BASOPHILS NFR BLD AUTO: 0.4 % — SIGNIFICANT CHANGE UP (ref 0–2)
BUN SERPL-MCNC: 15 MG/DL — SIGNIFICANT CHANGE UP (ref 7–23)
CALCIUM SERPL-MCNC: 9.3 MG/DL — SIGNIFICANT CHANGE UP (ref 8.4–10.5)
CHLORIDE SERPL-SCNC: 103 MMOL/L — SIGNIFICANT CHANGE UP (ref 98–107)
CO2 SERPL-SCNC: 23 MMOL/L — SIGNIFICANT CHANGE UP (ref 22–31)
CREAT SERPL-MCNC: 0.67 MG/DL — SIGNIFICANT CHANGE UP (ref 0.5–1.3)
EOSINOPHIL # BLD AUTO: 0.31 K/UL — SIGNIFICANT CHANGE UP (ref 0–0.5)
EOSINOPHIL NFR BLD AUTO: 4.5 % — SIGNIFICANT CHANGE UP (ref 0–6)
GLUCOSE SERPL-MCNC: 105 MG/DL — HIGH (ref 70–99)
HCG SERPL-ACNC: < 5 MIU/ML — SIGNIFICANT CHANGE UP
HCT VFR BLD CALC: 39.1 % — SIGNIFICANT CHANGE UP (ref 34.5–45)
HGB BLD-MCNC: 12.5 G/DL — SIGNIFICANT CHANGE UP (ref 11.5–15.5)
IMM GRANULOCYTES NFR BLD AUTO: 0.3 % — SIGNIFICANT CHANGE UP (ref 0–1.5)
LYMPHOCYTES # BLD AUTO: 2.25 K/UL — SIGNIFICANT CHANGE UP (ref 1–3.3)
LYMPHOCYTES # BLD AUTO: 32.9 % — SIGNIFICANT CHANGE UP (ref 13–44)
MCHC RBC-ENTMCNC: 24.3 PG — LOW (ref 27–34)
MCHC RBC-ENTMCNC: 32 % — SIGNIFICANT CHANGE UP (ref 32–36)
MCV RBC AUTO: 75.9 FL — LOW (ref 80–100)
MONOCYTES # BLD AUTO: 0.84 K/UL — SIGNIFICANT CHANGE UP (ref 0–0.9)
MONOCYTES NFR BLD AUTO: 12.3 % — SIGNIFICANT CHANGE UP (ref 2–14)
NEUTROPHILS # BLD AUTO: 3.38 K/UL — SIGNIFICANT CHANGE UP (ref 1.8–7.4)
NEUTROPHILS NFR BLD AUTO: 49.6 % — SIGNIFICANT CHANGE UP (ref 43–77)
NRBC # FLD: 0 K/UL — SIGNIFICANT CHANGE UP (ref 0–0)
PLATELET # BLD AUTO: 444 K/UL — HIGH (ref 150–400)
PMV BLD: 8.4 FL — SIGNIFICANT CHANGE UP (ref 7–13)
POTASSIUM SERPL-MCNC: 4.3 MMOL/L — SIGNIFICANT CHANGE UP (ref 3.5–5.3)
POTASSIUM SERPL-SCNC: 4.3 MMOL/L — SIGNIFICANT CHANGE UP (ref 3.5–5.3)
RBC # BLD: 5.15 M/UL — SIGNIFICANT CHANGE UP (ref 3.8–5.2)
RBC # FLD: 13.9 % — SIGNIFICANT CHANGE UP (ref 10.3–14.5)
SODIUM SERPL-SCNC: 138 MMOL/L — SIGNIFICANT CHANGE UP (ref 135–145)
WBC # BLD: 6.83 K/UL — SIGNIFICANT CHANGE UP (ref 3.8–10.5)
WBC # FLD AUTO: 6.83 K/UL — SIGNIFICANT CHANGE UP (ref 3.8–10.5)

## 2020-09-15 PROCEDURE — 71275 CT ANGIOGRAPHY CHEST: CPT | Mod: 26

## 2020-09-15 PROCEDURE — 93010 ELECTROCARDIOGRAM REPORT: CPT

## 2020-09-15 PROCEDURE — 99284 EMERGENCY DEPT VISIT MOD MDM: CPT | Mod: 25

## 2020-09-15 NOTE — ED ADULT TRIAGE NOTE - CHIEF COMPLAINT QUOTE
Pt. c/o pain to RUQ started earlier today. Denies n/v/d. hx of cholecystectomy. Pt. sent in by urgent care to r/o PE due to elevated d-dimer. Denies cp or sob.

## 2020-09-15 NOTE — ED ADULT NURSE NOTE - OBJECTIVE STATEMENT
received pt to rm 15 A&Ox4, ambulatory without assistance. 34y/o female no PMH complaining of sudden onset right sided breast [id number]. pt states she went to urgent care earlier today, states her CXR was normal but d-dimer was positive. pt also endorses Dyspnea on Exertion. resps are even and unlabored, spo2 100% on RA. Pt denies nausea/vomiting, chest pain, abdominal pain, SOB, fever/chills, palpitations. 20G IV placed to left arm, labs sent.

## 2021-11-30 ENCOUNTER — EMERGENCY (EMERGENCY)
Facility: HOSPITAL | Age: 36
LOS: 1 days | Discharge: HOME | End: 2021-11-30
Attending: EMERGENCY MEDICINE
Payer: COMMERCIAL

## 2021-11-30 VITALS
SYSTOLIC BLOOD PRESSURE: 138 MMHG | OXYGEN SATURATION: 100 % | HEIGHT: 62 IN | WEIGHT: 208.78 LBS | DIASTOLIC BLOOD PRESSURE: 91 MMHG | HEART RATE: 103 BPM | RESPIRATION RATE: 18 BRPM | TEMPERATURE: 99 F

## 2021-11-30 DIAGNOSIS — Z98.891 HISTORY OF UTERINE SCAR FROM PREVIOUS SURGERY: Chronic | ICD-10-CM

## 2021-11-30 PROCEDURE — 99284 EMERGENCY DEPT VISIT MOD MDM: CPT

## 2021-11-30 PROCEDURE — 99283 EMERGENCY DEPT VISIT LOW MDM: CPT

## 2021-11-30 RX ORDER — DEXAMETHASONE 0.5 MG/5ML
6 ELIXIR ORAL ONCE
Refills: 0 | Status: COMPLETED | OUTPATIENT
Start: 2021-11-30 | End: 2021-11-30

## 2021-11-30 RX ORDER — IBUPROFEN 200 MG
600 TABLET ORAL ONCE
Refills: 0 | Status: COMPLETED | OUTPATIENT
Start: 2021-11-30 | End: 2021-11-30

## 2021-11-30 RX ADMIN — Medication 6 MILLIGRAM(S): at 20:36

## 2021-11-30 NOTE — ED ADULT NURSE NOTE - PAIN: PRESENCE, MLM
Pt was brought in by  [facility] from [place] for wound. Pt has a wound to left 2nd toe that is being treated by wound care. Staff states that she just got back from vacation and noted that it was worse. Staff called wound care doctor and advised them to come to er.    Pt wearing mask on arrival. Staff wearing mask and goggles at time of triage.      denies pain/discomfort

## 2021-11-30 NOTE — ED PROVIDER NOTE - NSFOLLOWUPINSTRUCTIONS_ED_ALL_ED_FT
covid. motrin and decadron 6mg x1. tylenol 650mg every 4 hrs and/or motrin 600mg every 6 hrs.  remain quarantine for 3 days until no fever symptoms and at least 7 days. return if unable to speak in full sentences

## 2021-11-30 NOTE — ED PROVIDER NOTE - PATIENT PORTAL LINK FT
You can access the FollowMyHealth Patient Portal offered by [facility] by registering at the following website: [email or web address]. By joining [facility]’s FollowMyHealth portal, you will also be able to view your health information using other applications (apps) compatible with our system.

## 2021-11-30 NOTE — ED PROVIDER NOTE - OBJECTIVE STATEMENT
36 yr old female with year round allergies, hypothyroid presents to ed c/o chest congestion, dyspnea and elevated HR. + covid 8 days ago. sx started 10 days ago. no fver, + fatigue.

## 2021-11-30 NOTE — ED ADULT NURSE NOTE - NSIMPLEMENTINTERV_GEN_ALL_ED
Implemented All Universal Safety Interventions:  [place] to call system. Call bell, personal items and telephone within reach. Instruct patient to call for assistance. Room bathroom lighting operational. Non-slip footwear when patient is off stretcher. Physically safe environment: no spills, clutter or unnecessary equipment. Stretcher in lowest position, wheels locked, appropriate side rails in place.

## 2021-11-30 NOTE — ED PROVIDER NOTE - CLINICAL SUMMARY MEDICAL DECISION MAKING FREE TEXT BOX
36 yr old female with year round allergies, hypothyroid presents to ed c/o chest congestion, dyspnea and elevated HR. + covid 8 days ago. sx started 10 days ago. no fver, + fatigue.    covid. motrin and decadron 6mg x1. tylenol 650mg every 4 hrs and/or motrin 600mg every 6 hrs.  remain quarantine for 3 days until no fever symptoms and at least 7 days. return if unable to speak in full sentences

## 2022-02-06 NOTE — PROGRESS NOTE ADULT - SUBJECTIVE AND OBJECTIVE BOX
CLINICAL NUTRITION SERVICES - BRIEF NOTE    Consult received for Nutrition Education - Dietitian to see for Heart Healthy Diet Education    RD to see patient as able for education prior to discharge.     Shelbi Ferris, [place], LD     Anesthesia Pain Management Service    SUBJECTIVE: Pt now off IV PCA without problems reported.    Therapy:	  [ X] IV PCA	   [ ] Epidural           [ ] s/p Spinal Opoid              [ ] Postpartum infusion	  [ ] Patient controlled regional anesthesia (PCRA)    [ ] prn Analgesics    Allergies    No Known Drug Allergies  shrimp (Hives; Swelling)    Intolerances      MEDICATIONS  (STANDING):  dextrose 5% + sodium chloride 0.45% 1000 milliLiter(s) (50 mL/Hr) IV Continuous <Continuous>  enoxaparin Injectable 40 milliGRAM(s) SubCutaneous every 24 hours    MEDICATIONS  (PRN):  HYDROmorphone  Injectable 0.5 milliGRAM(s) IV Push every 3 hours PRN Severe Pain (7 - 10)  oxyCODONE    Solution 5 milliGRAM(s) Oral every 3 hours PRN Moderate Pain (4 - 6)      OBJECTIVE:   [X] No new signs     [ ] Other:    Side Effects:  [X ] None			[ ] Other:    Assessment of Catheter Site:		[ ] Intact		[ ] Other:    ASSESSMENT/PLAN  [ ] Continue current therapy    [X ] Therapy changed to:    [ ] IV PCA       [ ] Epidural     [ X] prn Analgesics     Comments: Opioids or Adjuvent analgesics to be used at this point.    Progress Note written now but Patient was seen earlier.

## 2022-04-04 NOTE — DISCHARGE NOTE ADULT - FUNCTIONAL SCREEN CURRENT LEVEL: AMBULATION, MLM
----- Message from Mela Rider MD sent at 4/4/2022  4:46 PM CDT -----  Ultrasound shows a single IUP of 7 weeks 5 days, giving an MAYRA of 11/16/22.  Please add to prenatal data and call patient.   (2) assistive person (0) independent

## 2024-01-18 NOTE — PRE-OP CHECKLIST - WARM FLUIDS/WARM BLANKETS
Schedule appointment with Dr. Michelle - cardiology - heart doctor  Schedule port placement through radiology at [facility]  Schedule echocardiogram - ultrasound for your heart.     Radiation oncology will call you to schedule an appointment.    We will call you about PET scan or if we need to do something else.  THIS IS MOST IMPORTANT to do quickly once we know more information.     Chemo teach appointment      
no
no

## 2024-06-08 NOTE — PROGRESS NOTE ADULT - I WAS PHYSICALLY PRESENT FOR THE KEY PORTIONS OF THE EVALUATION AND MANAGEMENT (E/M) SERVICE PROVIDED.  I AGREE WITH THE ABOVE HISTORY, PHYSICAL, AND PLAN WHICH I HAVE REVIEWED AND EDITED WHERE APPROPRIATE
Goal Outcome Evaluation:         Patient back from cath lab around 1530, she was having nausea and had a HA, CP 2-3/10. Intervention to mLAD and D1, D1 & LAD, via right radial site, site is stable, no bleeding, no hematoma   Nausea meds given, stable, she is  tolerating diet and fluids,.  SR on the monitor. Bp stable, O2 2;Lnc...   Repeat echo completed after procedure. Dr Agustin aware, and stated to monitor for SOB, hypotension and and other sx...   Patient denies feeling worse than prior to procedure...                 Statement Selected